# Patient Record
Sex: FEMALE | Race: WHITE | NOT HISPANIC OR LATINO | URBAN - METROPOLITAN AREA
[De-identification: names, ages, dates, MRNs, and addresses within clinical notes are randomized per-mention and may not be internally consistent; named-entity substitution may affect disease eponyms.]

---

## 2017-05-09 ENCOUNTER — EMERGENCY (EMERGENCY)
Facility: HOSPITAL | Age: 67
LOS: 0 days | Discharge: HOME | End: 2017-05-09
Attending: EMERGENCY MEDICINE | Admitting: EMERGENCY MEDICINE
Payer: COMMERCIAL

## 2017-05-09 VITALS
RESPIRATION RATE: 18 BRPM | DIASTOLIC BLOOD PRESSURE: 86 MMHG | SYSTOLIC BLOOD PRESSURE: 149 MMHG | OXYGEN SATURATION: 98 % | TEMPERATURE: 98 F | HEART RATE: 79 BPM

## 2017-05-09 VITALS — WEIGHT: 106.04 LBS | HEIGHT: 60 IN

## 2017-05-09 DIAGNOSIS — S52.502A UNSPECIFIED FRACTURE OF THE LOWER END OF LEFT RADIUS, INITIAL ENCOUNTER FOR CLOSED FRACTURE: ICD-10-CM

## 2017-05-09 DIAGNOSIS — Z98.890 OTHER SPECIFIED POSTPROCEDURAL STATES: Chronic | ICD-10-CM

## 2017-05-09 DIAGNOSIS — I10 ESSENTIAL (PRIMARY) HYPERTENSION: ICD-10-CM

## 2017-05-09 DIAGNOSIS — I34.0 NONRHEUMATIC MITRAL (VALVE) INSUFFICIENCY: ICD-10-CM
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
## 2017-05-09 PROCEDURE — 73090 X-RAY EXAM OF FOREARM: CPT | Mod: 26,LT

## 2017-05-09 PROCEDURE — 73110 X-RAY EXAM OF WRIST: CPT | Mod: 26,LT

## 2017-05-09 PROCEDURE — 99282 EMERGENCY DEPT VISIT SF MDM: CPT

## 2017-05-09 RX ORDER — MORPHINE SULFATE 50 MG/1
4 CAPSULE, EXTENDED RELEASE ORAL ONCE
Qty: 0 | Refills: 0 | Status: DISCONTINUED | OUTPATIENT
Start: 2017-05-09 | End: 2017-05-09

## 2017-05-09 RX ADMIN — MORPHINE SULFATE 4 MILLIGRAM(S): 50 CAPSULE, EXTENDED RELEASE ORAL at 18:19

## 2017-05-09 RX ADMIN — MORPHINE SULFATE 4 MILLIGRAM(S): 50 CAPSULE, EXTENDED RELEASE ORAL at 18:49

## 2017-05-09 NOTE — ED ADULT NURSE REASSESSMENT NOTE - NS ED NURSE REASSESS COMMENT FT1
Report taken at the change of shift. Pt awake alert and oriented x4 resting comfortably in bed with no acute distress noted. Agree with previous RN's assessment. c/o 8/10 left arm pain. VSS. Afebrile. Family at bedside. Awaiting for ortho. Will cont to monitor for safety and comfort.
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
## 2017-05-09 NOTE — ED STATDOCS - NS ED MD SCRIBE ATTENDING SCRIBE SECTIONS
DISPOSITION/HISTORY OF PRESENT ILLNESS/PHYSICAL EXAM/PAST MEDICAL/SURGICAL/SOCIAL HISTORY/RESULTS/REVIEW OF SYSTEMS/PROGRESS NOTE

## 2017-05-09 NOTE — ED STATDOCS - CARE PLAN
Principal Discharge DX:	Distal radius fracture  Instructions for follow-up, activity and diet:	1) Please take 400 mg of Ibuprofen (aka Motrin, Advil) and/or 500 mg Acetaminophen (aka Tylenol) every 6 hours, as needed, for mild-moderate pain.  Please do not take these medications if you have any history of bleeding disorders, kidney or liver disease.  2) Please take PERCOCET every 6 hours, as needed, for SEVERE pain. Please do not drive, make any important decisions or operate heavy machinery while on this medication.  This medication is very addicting, do not take unless absolutely necessary.  This medication also causes constipation.  3) Please follow-up with Dr. Mcrae as an outpatient.    4) If you have any worsening of symptoms please return to the ED immediately.  5) Please follow-up with Opthalmology as an outpatient.

## 2017-05-09 NOTE — ED STATDOCS - MUSCULOSKELETAL, MLM
DROM of left wrist secondary to the pain. TTP and edema left wrist. 2+ radial pulses. +DNVI. DROM of left wrist secondary to the pain. TTP and edema left wrist. 2+ radial pulses. +DNVI.  deformity left wrist.

## 2017-05-09 NOTE — ED STATDOCS - ATTENDING CONTRIBUTION TO CARE
I performed the initial face to face bedside interview with this patient regarding history of present illness, review of symptoms and past medical, social and family history.  I completed an independent physical examination.  I was the initial provider who evaluated this patient.  The history, review of symptoms and examination was documented by the resident in my presence and I attest to the accuracy of the documentation.  I have signed out the follow up of any pending tests (i.e. labs, radiological studies) to the resident.  I have discussed the patient’s plan of care and disposition with the resident.     I personally performed the services described in the documentation, reviewed the documentation recorded by the scribe in my presence and it accurately and completely records my words and action.

## 2017-05-09 NOTE — ED STATDOCS - PHYSICAL EXAMINATION
abrasion upper lip, inner aspect  no nasal septal hematoma, no loose teeth, no tongue biting abrasion upper lip, inner aspect  no nasal septal hematoma, no loose teeth, no tongue biting  visual fields grossly intact. states that she has some blurry vision in the right eye abrasion upper lip, inner aspect  no nasal septal hematoma, no loose teeth, no tongue biting  visual fields grossly intact. states that she has some blurry vision in the right eye    Pt with left wrist pain, swelling.  2+ radial pulse intact.  Decreased ROM 2/2 pain.  Sensory exam intact L hand/arm.  Yosi Peck DO.

## 2017-05-09 NOTE — ED STATDOCS - PSH
H/O cataract extraction, right    History of   x 4  History of trabeculectomy  right  History of vitrectomy  Right eye  Hx of hysterectomy    S/P left knee arthroscopy

## 2017-05-09 NOTE — ED STATDOCS - PLAN OF CARE
1) Please take 400 mg of Ibuprofen (aka Motrin, Advil) and/or 500 mg Acetaminophen (aka Tylenol) every 6 hours, as needed, for mild-moderate pain.  Please do not take these medications if you have any history of bleeding disorders, kidney or liver disease.  2) Please take PERCOCET every 6 hours, as needed, for SEVERE pain. Please do not drive, make any important decisions or operate heavy machinery while on this medication.  This medication is very addicting, do not take unless absolutely necessary.  This medication also causes constipation.  3) Please follow-up with Dr. Mcrae as an outpatient.    4) If you have any worsening of symptoms please return to the ED immediately.  5) Please follow-up with Opthalmology as an outpatient.

## 2017-05-09 NOTE — ED STATDOCS - PMH
Arthritis    Glaucoma    H/O irritable bowel syndrome    H/O: hypertension    MR (mitral regurgitation)    OP (osteoporosis)    Psoriasis    Tricuspid regurgitation
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
## 2020-10-16 ENCOUNTER — APPOINTMENT (OUTPATIENT)
Dept: RHEUMATOLOGY | Facility: CLINIC | Age: 70
End: 2020-10-16
Payer: COMMERCIAL

## 2020-10-16 VITALS
BODY MASS INDEX: 21.6 KG/M2 | SYSTOLIC BLOOD PRESSURE: 110 MMHG | DIASTOLIC BLOOD PRESSURE: 70 MMHG | HEIGHT: 60 IN | HEART RATE: 67 BPM | OXYGEN SATURATION: 97 % | WEIGHT: 110 LBS | TEMPERATURE: 97.5 F

## 2020-10-16 DIAGNOSIS — H40.9 UNSPECIFIED GLAUCOMA: ICD-10-CM

## 2020-10-16 DIAGNOSIS — Z84.0 FAMILY HISTORY OF DISEASES OF THE SKIN AND SUBCUTANEOUS TISSUE: ICD-10-CM

## 2020-10-16 DIAGNOSIS — Z78.9 OTHER SPECIFIED HEALTH STATUS: ICD-10-CM

## 2020-10-16 DIAGNOSIS — Z82.69 FAMILY HISTORY OF OTHER DISEASES OF THE MUSCULOSKELETAL SYSTEM AND CONNECTIVE TISSUE: ICD-10-CM

## 2020-10-16 DIAGNOSIS — I10 ESSENTIAL (PRIMARY) HYPERTENSION: ICD-10-CM

## 2020-10-16 DIAGNOSIS — Z82.61 FAMILY HISTORY OF ARTHRITIS: ICD-10-CM

## 2020-10-16 DIAGNOSIS — D75.1 SECONDARY POLYCYTHEMIA: ICD-10-CM

## 2020-10-16 PROCEDURE — 99243 OFF/OP CNSLTJ NEW/EST LOW 30: CPT

## 2020-10-16 RX ORDER — CLOBETASOL PROPIONATE 0.5 MG/G
0.05 AEROSOL, FOAM TOPICAL TWICE DAILY
Qty: 1 | Refills: 3 | Status: ACTIVE | COMMUNITY
Start: 2020-10-16 | End: 1900-01-01

## 2020-10-16 RX ORDER — DICLOFENAC SODIUM 10 MG/G
1 GEL TOPICAL
Qty: 1 | Refills: 3 | Status: ACTIVE | COMMUNITY
Start: 2020-10-16 | End: 1900-01-01

## 2020-10-16 RX ORDER — HYDROCHLOROTHIAZIDE 12.5 MG/1
12.5 TABLET ORAL
Qty: 90 | Refills: 0 | Status: ACTIVE | COMMUNITY

## 2020-10-16 RX ORDER — LOTEPREDNOL ETABONATE 5 MG/ML
0.5 SUSPENSION/ DROPS OPHTHALMIC
Refills: 0 | Status: ACTIVE | COMMUNITY

## 2020-10-16 NOTE — REVIEW OF SYSTEMS
[Arthralgias] : arthralgias [Joint Pain] : joint pain [As Noted in HPI] : as noted in HPI [Skin Lesions] : skin lesion [Negative] : Heme/Lymph [FreeTextEntry9] : no synovitis, fROM  [de-identified] : scalp psoriasis  [FreeTextEntry3] : visual loss R eye long standing and denies inflammatory eye dz

## 2020-10-16 NOTE — DATA REVIEWED
[FreeTextEntry1] : \par Other Diagnostics: \par MRI R shoulder 8/20 w/ tendinosis and RTC pathology, AC arthritis and small GH effusion, biceps tendinosis

## 2020-10-16 NOTE — PHYSICAL EXAM
[General Appearance - Alert] : alert [General Appearance - Well Nourished] : well nourished [General Appearance - In No Acute Distress] : in no acute distress [General Appearance - Well Developed] : well developed [General Appearance - Well-Appearing] : healthy appearing [Sclera] : the sclera and conjunctiva were normal [Outer Ear] : the ears and nose were normal in appearance [Nasal Cavity] : the nasal mucosa and septum were normal [Neck Appearance] : the appearance of the neck was normal [Neck Cervical Mass (___cm)] : no neck mass was observed [Jugular Venous Distention Increased] : there was no jugular-venous distention [Thyroid Diffuse Enlargement] : the thyroid was not enlarged [Auscultation Breath Sounds / Voice Sounds] : lungs were clear to auscultation bilaterally [Thyroid Nodule] : there were no palpable thyroid nodules [Heart Sounds Gallop] : no gallops [Heart Rate And Rhythm] : heart rate was normal and rhythm regular [Heart Sounds] : normal S1 and S2 [Heart Sounds Pericardial Friction Rub] : no pericardial rub [Murmurs] : no murmurs [Full Pulse] : the pedal pulses are present [Edema] : there was no peripheral edema [Abdomen Soft] : soft [Bowel Sounds] : normal bowel sounds [Abdomen Tenderness] : non-tender [] : no hepato-splenomegaly [Cervical Lymph Nodes Enlarged Posterior Bilaterally] : posterior cervical [Abdomen Mass (___ Cm)] : no abdominal mass palpated [Cervical Lymph Nodes Enlarged Anterior Bilaterally] : anterior cervical [Supraclavicular Lymph Nodes Enlarged Bilaterally] : supraclavicular [No CVA Tenderness] : no ~M costovertebral angle tenderness [No Spinal Tenderness] : no spinal tenderness [Abnormal Walk] : normal gait [Musculoskeletal - Swelling] : no joint swelling seen [Nail Clubbing] : no clubbing  or cyanosis of the fingernails [Motor Tone] : muscle strength and tone were normal [FreeTextEntry1] : Scalp psoriasis patch over L side occipital region; no other lesions seen; nails painted [Deep Tendon Reflexes (DTR)] : deep tendon reflexes were 2+ and symmetric [Sensation] : the sensory exam was normal to light touch and pinprick [No Focal Deficits] : no focal deficits [Oriented To Time, Place, And Person] : oriented to person, place, and time [Impaired Insight] : insight and judgment were intact [Affect] : the affect was normal

## 2020-10-16 NOTE — CONSULT LETTER
[Dear  ___] : Dear  [unfilled], [Consult Closing:] : Thank you very much for allowing me to participate in the care of this patient.  If you have any questions, please do not hesitate to contact me. [Consult Letter:] : I had the pleasure of evaluating your patient, [unfilled]. [FreeTextEntry1] : Please see below for summary of  recent rheumatology evaluation and recommendations.\par \par 70 yo wf  healthy, mild HTN well controlled and small patch psoriasis - scalp with c/o joint pain.  \par \par 1) Psoriasis:  only one patch with joint pain but no over synovitis.  R shoulder RTC pathology and tendinosis and recent rupture biceps tendon.. but exercises/ heavy lifting and thought to be r/t... no other spontaneous sx.  Occas sx of plantar fascitis, not active now.. again exercises routinely.. \par Little on exam today to sugget PsA.. literature provided and advised to return immediately if present.  \par No hx inflammatory back sx and no SI tenderness, will not do imaging at this point, if sx change would then consider, denies inflammatory eye dz and no  triggering or  locking \par NO inflammatory back pain\par Recent comprehensive labs for elevated Hb.. w/u neg but will review to evaluate for evidence of systemic inflammatory condition.. \par + FH mother also w/ Psoriasis  \par \par 2) OA:  diffusely, mild with no deformities and fROM throughout\par \par 3) IBS-C \par + IBS C daily probiotic + response..and Ibguard..\par \par 4) Raynauds phenomenon:  mild, for many ys w/ little in H & P to suggest CTD.  Denies mucositis, serositis (no cardiopulmonary, HSM), no renal dysfunction, rash, alopecia, cytopenias, bleeding or clotting dyscrasias\par \par 5) Osteoporosis:  on Actonel monthly but admits to poor compliance though well tolerated. Updated study 11/20 scheduled.. Dr. Jose \par Taking Vit D and routine wt bearing activity\par Stable wt x many ys w/ BMI 21\par Minimal other risk factors \par L wrist fracture - traumatically induced post menopausal/ R ankle fracture- trauma premenopausal \par \par Age appropriate malignancy screening:  \par colonoscopy/ endoscopy, mammogram 2020 neg, pap 2020, no recent  CXR\par Osteoporosis 2019 updated.. severe \par \par Plan: \par - Voltaren gel for joint pain and continue routine exercise, total conditioning for joint protection\par - literature provided PsA, reassurance that little to suggest at this time, call immediately if anything changes \par - clobetasol foam for scalp lesions.. apply twice daily as needed\par - f/u Dr. Jose for OP updated eval/ treatment.  Discussed Relast if compliance continues to be an issue \par - need labs from Dr Andres' w/u - hem/ onc\par  [FreeTextEntry2] : Rafa Judge MD\par [phone] [Sincerely,] : Sincerely, [FreeTextEntry3] : Cayla Yates DNP, ANP-C\par Division or Rheumatology\par [facility]\par \par

## 2020-10-16 NOTE — HISTORY OF PRESENT ILLNESS
[FreeTextEntry1] : 68 yo wf  w/ hx of psoriasis with recent increase in joint pain involving  knees, hands, R ankle (old fracture, intermittent swelling),  and most significantly R shoulder mild intermittent ... \par Stiffness in am less than 30 mins \par Hx of plantar fascitis, occas shoulder bursitis.. with RTC partial tear (confirmed MRI w/ diffuse inflammatory changes)  and bicep tear R arm.. 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 seen by Dr. Andres no treatment or additional studies beyond labs - BM not indicated \par + IBS C daily probiotic + response..and Ibguard..\par  \par Occas numbness w/ raynauds changes for many no digital ulcerations.  \par \par Mild HTN \par Age appropriate malignancy screening:  \par colonoscopy/ endoscopy, mammogram, pap 2020  smear, CXR\par Osteoporosis 2019 updated.. severe \par \par +FH mother psoriasis.. .

## 2020-10-19 ENCOUNTER — APPOINTMENT (OUTPATIENT)
Dept: ORTHOPEDIC SURGERY | Facility: CLINIC | Age: 70
End: 2020-10-19
Payer: COMMERCIAL

## 2020-10-19 VITALS
WEIGHT: 110 LBS | BODY MASS INDEX: 21.6 KG/M2 | HEIGHT: 60 IN | HEART RATE: 69 BPM | TEMPERATURE: 98.7 F | SYSTOLIC BLOOD PRESSURE: 151 MMHG | DIASTOLIC BLOOD PRESSURE: 75 MMHG

## 2020-10-19 DIAGNOSIS — Z87.39 PERSONAL HISTORY OF OTHER DISEASES OF THE MUSCULOSKELETAL SYSTEM AND CONNECTIVE TISSUE: ICD-10-CM

## 2020-10-19 DIAGNOSIS — Z72.89 OTHER PROBLEMS RELATED TO LIFESTYLE: ICD-10-CM

## 2020-10-19 DIAGNOSIS — M25.511 PAIN IN RIGHT SHOULDER: ICD-10-CM

## 2020-10-19 DIAGNOSIS — Z86.79 PERSONAL HISTORY OF OTHER DISEASES OF THE CIRCULATORY SYSTEM: ICD-10-CM

## 2020-10-19 DIAGNOSIS — G89.29 PAIN IN RIGHT SHOULDER: ICD-10-CM

## 2020-10-19 PROCEDURE — 99072 ADDL SUPL MATRL&STAF TM PHE: CPT

## 2020-10-19 PROCEDURE — 99203 OFFICE O/P NEW LOW 30 MIN: CPT

## 2020-10-21 NOTE — HISTORY OF PRESENT ILLNESS
[5] : a current pain level of 5/10 [6] : the ailment interference is 6/10 [8] : the ailment interference is 8/10 [7] : the ailment interference is 7/10 [(Does not interfere) 0] : the ailment interference is 0/10 (does not interfere) [9] : the ailment interference is 9/10 [2] : the ailment interference is 2/10 [de-identified] : Nicky comes in today with complaints to her right shoulder.  She had an MRI over eight to nine months ago.  Subsequently, she tore her proximal biceps.  She is having persistent complaints of pain.  She states she thinks she had two, maybe three, injections last year.  The patient states the pain is constant.  The patient describes the pain as throbbing.  The patient states ice and moist heat make the symptoms better while lifting and lying down makes the symptoms worse.  [] : No [de-identified] : Advil [de-identified] : Extra Strength Tylenol

## 2020-10-21 NOTE — PHYSICAL EXAM
[de-identified] : Left Shoulder:\par Shoulder: Range of Motion in Degrees:   	\par    	                        Claimant:  	Normal:  	\par Abduction (Active)  	180  	180 degrees  	\par Abduction (Passive)  	180  	180 degrees  	\par Forward elevation (Active):  	180  	180 degrees  	\par Forward elevation (Passive):  180  	180 degrees  	\par External rotation (Active):  	45  	45 degrees  	\par External rotation (Passive):  	45  	45 degrees  	\par Internal rotation (Active):  	L-1  	L-1  	\par Internal rotation (Passive):  	L-1  	L-1  \par 	\par No motor weakness to internal rotation, external rotation or abduction in the scapular plane.  Negative crank test.  Negative O’Gustavo’s test.  Negative Speed’s test. Negative Yergason’s test.  Negative cross arm test.  No tenderness to palpation at the AC joint. Negative Hawkin’s sign.  Negative Neer’s sign.  Negative apprehension. Negative sulcus sign.  No gross neurological or vascular deficits distally.  Skin is intact.  No rashes, scars or lesions. 2+ radial and ulnar pulses. No extra-articular swelling or tenderness.\par  \par Right Shoulder:\par Shoulder: Range of Motion in Degrees:	\par 	                                  Claimant:	Normal:	\par Abduction (Active)	                  180	               180 degrees	\par Abduction (Passive)	  180	               180 degrees	\par Forward elevation (Active):	  180	               180 degrees	\par Forward elevation (Passive):	  180	               180 degrees	\par External rotation (Active):	   45	               45 degrees	\par External rotation (Passive):	   45	               45 degrees	\par Internal rotation (Active):	   L-1	               L-1	\par Internal rotation (Passive):	   L-1	               L-1	\par  \par No motor weakness to internal rotation, external rotation or abduction in the scapular plane.  Negative crank test.  Negative O’Gustavo’s test.  Negative Speed’s test. Negative Yergason’s test.  Positive cross arm test.  Positive tenderness to palpation over the AC joint.  Positive Hawkin’s sign.  Positive Neer’s sign. Negative apprehension. Negative sulcus sign.  No gross neurological or vascular deficits distally.  Positive proximal biceps rupture.  Positive Calvin sign.  Positive ecchymosis.  2+ radial and ulnar pulses. No extra-articular swelling or tenderness. \par \par 	\par  [de-identified] : Gait: normal    Station: normal  [de-identified] : Appearance: Well-developed, well-nourished female  in no acute distress.

## 2020-10-21 NOTE — DISCUSSION/SUMMARY
[de-identified] : The patient presents with impingement, AC arthritis and proximal biceps rupture, right shoulder.  At this time I recommend an updated MRI before consideration for a repeat injection.  We discussed the potential for further therapy and she will be reassessed in two or three weeks.

## 2020-10-21 NOTE — ADDENDUM
[FreeTextEntry1] : This note was written by Jennifer Menchaca on 10/20/2020 acting as scribe for Kali Smith III, MD

## 2020-10-26 ENCOUNTER — RESULT REVIEW (OUTPATIENT)
Age: 70
End: 2020-10-26

## 2020-10-26 ENCOUNTER — TRANSCRIPTION ENCOUNTER (OUTPATIENT)
Age: 70
End: 2020-10-26

## 2020-10-26 ENCOUNTER — OUTPATIENT (OUTPATIENT)
Dept: OUTPATIENT SERVICES | Facility: HOSPITAL | Age: 70
LOS: 1 days | End: 2020-10-26
Payer: COMMERCIAL

## 2020-10-26 ENCOUNTER — APPOINTMENT (OUTPATIENT)
Dept: MRI IMAGING | Facility: CLINIC | Age: 70
End: 2020-10-26
Payer: COMMERCIAL

## 2020-10-26 DIAGNOSIS — Z98.890 OTHER SPECIFIED POSTPROCEDURAL STATES: Chronic | ICD-10-CM

## 2020-10-26 DIAGNOSIS — M25.511 PAIN IN RIGHT SHOULDER: ICD-10-CM

## 2020-10-26 PROCEDURE — 73221 MRI JOINT UPR EXTREM W/O DYE: CPT

## 2020-10-26 PROCEDURE — 73221 MRI JOINT UPR EXTREM W/O DYE: CPT | Mod: 26,RT

## 2020-10-27 ENCOUNTER — APPOINTMENT (OUTPATIENT)
Dept: ORTHOPEDIC SURGERY | Facility: CLINIC | Age: 70
End: 2020-10-27

## 2020-11-02 ENCOUNTER — APPOINTMENT (OUTPATIENT)
Dept: ORTHOPEDIC SURGERY | Facility: CLINIC | Age: 70
End: 2020-11-02
Payer: COMMERCIAL

## 2020-11-02 DIAGNOSIS — M19.011 PRIMARY OSTEOARTHRITIS, RIGHT SHOULDER: ICD-10-CM

## 2020-11-02 DIAGNOSIS — M75.121 COMPLETE ROTATOR CUFF TEAR OR RUPTURE OF RIGHT SHOULDER, NOT SPECIFIED AS TRAUMATIC: ICD-10-CM

## 2020-11-02 DIAGNOSIS — S46.219A STRAIN OF MUSCLE, FASCIA AND TENDON OF OTHER PARTS OF BICEPS, UNSPECIFIED ARM, INITIAL ENCOUNTER: ICD-10-CM

## 2020-11-02 PROCEDURE — 99441: CPT

## 2020-11-12 PROBLEM — S46.219A BICEPS TENDON TEAR: Status: ACTIVE | Noted: 2020-10-16

## 2021-01-13 ENCOUNTER — APPOINTMENT (OUTPATIENT)
Dept: ORTHOPEDIC SURGERY | Facility: CLINIC | Age: 71
End: 2021-01-13
Payer: COMMERCIAL

## 2021-01-13 VITALS
DIASTOLIC BLOOD PRESSURE: 79 MMHG | TEMPERATURE: 95.3 F | HEIGHT: 60 IN | HEART RATE: 68 BPM | SYSTOLIC BLOOD PRESSURE: 162 MMHG | BODY MASS INDEX: 21.2 KG/M2 | WEIGHT: 108 LBS

## 2021-01-13 PROCEDURE — 99072 ADDL SUPL MATRL&STAF TM PHE: CPT

## 2021-01-13 PROCEDURE — 20610 DRAIN/INJ JOINT/BURSA W/O US: CPT | Mod: RT

## 2021-01-14 NOTE — PHYSICAL EXAM
[de-identified] : Right Shoulder:  \par Range of Motion in Degrees:	\par 	                                  Claimant:	Normal:	\par Abduction (Active)	                  180	               180 degrees	\par Abduction (Passive)	  180	               180 degrees	\par Forward elevation (Active):	  180	               180 degrees	\par Forward elevation (Passive):	  180	               180 degrees	\par External rotation (Active):	   45	               45 degrees	\par External rotation (Passive):	   45	               45 degrees	\par Internal rotation (Active):	   L-1	               L-1	\par Internal rotation (Passive):	   L-1	               L-1	\par  \par No motor weakness to internal rotation, external rotation or abduction in the scapular plane.  Negative crank test.  Negative O’Gustavo’s test.  Negative Speed’s test. Negative Yergason’s test.  Negative cross arm test.  No tenderness to palpation at the AC joint. Positive Hawkin’s sign.  Positive Neer’s sign. Negative apprehension. Negative sulcus sign.  No gross neurological or vascular deficits distally.  Skin is intact.  No rashes, scars or lesions. 2+ radial and ulnar pulses. No extra-articular swelling or tenderness.\par   [de-identified] : Appearance:  Well-developed, well-nourished female in no acute distress.\par   [de-identified] : Ambulating with a normal gait.  Station:  Normal.

## 2021-01-14 NOTE — PROCEDURE
[de-identified] : Consent: \par At this time, I have recommended an injection to the right shoulder.  The risks and benefits of the procedure were discussed with the patient in detail.  Upon verbal consent of the patient, we proceeded with the injection as noted below.  \par \par Procedure:  \par After a sterile prep, the patient underwent an injection of 9 cc of 1% Lidocaine without epinephrine and 1 cc of Kenalog into the right shoulder.  The patient tolerated the procedure well.  There were no complications.  \par

## 2021-01-14 NOTE — DISCUSSION/SUMMARY
[de-identified] : At this time, due to impingement syndrome of the right shoulder, I recommended ice and elevation.  She will be reassessed in three to four weeks.

## 2021-01-14 NOTE — ADDENDUM
[FreeTextEntry1] : This note was written by Sanjuanita Arias on 01/14/2021 acting as a scribe for JUDY FARR III, MD

## 2021-01-28 ENCOUNTER — APPOINTMENT (OUTPATIENT)
Dept: ORTHOPEDIC SURGERY | Facility: CLINIC | Age: 71
End: 2021-01-28
Payer: COMMERCIAL

## 2021-01-28 DIAGNOSIS — M75.41 IMPINGEMENT SYNDROME OF RIGHT SHOULDER: ICD-10-CM

## 2021-01-28 PROCEDURE — 99441: CPT

## 2021-02-03 PROBLEM — M75.41 IMPINGEMENT SYNDROME OF RIGHT SHOULDER: Status: ACTIVE | Noted: 2020-10-19

## 2021-08-26 ENCOUNTER — NON-APPOINTMENT (OUTPATIENT)
Age: 71
End: 2021-08-26

## 2021-08-26 ENCOUNTER — LABORATORY RESULT (OUTPATIENT)
Age: 71
End: 2021-08-26

## 2021-08-26 ENCOUNTER — OUTPATIENT (OUTPATIENT)
Dept: OUTPATIENT SERVICES | Facility: HOSPITAL | Age: 71
LOS: 1 days | End: 2021-08-26
Payer: COMMERCIAL

## 2021-08-26 ENCOUNTER — APPOINTMENT (OUTPATIENT)
Dept: RHEUMATOLOGY | Facility: CLINIC | Age: 71
End: 2021-08-26
Payer: COMMERCIAL

## 2021-08-26 ENCOUNTER — APPOINTMENT (OUTPATIENT)
Dept: RADIOLOGY | Facility: CLINIC | Age: 71
End: 2021-08-26
Payer: COMMERCIAL

## 2021-08-26 VITALS
HEART RATE: 68 BPM | OXYGEN SATURATION: 98 % | WEIGHT: 108 LBS | SYSTOLIC BLOOD PRESSURE: 138 MMHG | TEMPERATURE: 97.6 F | BODY MASS INDEX: 21.09 KG/M2 | DIASTOLIC BLOOD PRESSURE: 72 MMHG

## 2021-08-26 DIAGNOSIS — Z98.890 OTHER SPECIFIED POSTPROCEDURAL STATES: Chronic | ICD-10-CM

## 2021-08-26 DIAGNOSIS — D75.89 OTHER SPECIFIED DISEASES OF BLOOD AND BLOOD-FORMING ORGANS: ICD-10-CM

## 2021-08-26 DIAGNOSIS — L40.9 PSORIASIS, UNSPECIFIED: ICD-10-CM

## 2021-08-26 DIAGNOSIS — M46.1 SACROILIITIS, NOT ELSEWHERE CLASSIFIED: ICD-10-CM

## 2021-08-26 DIAGNOSIS — D78.89 OTHER POSTPROCEDURAL COMPLICATIONS OF THE SPLEEN: ICD-10-CM

## 2021-08-26 DIAGNOSIS — L40.50 ARTHROPATHIC PSORIASIS, UNSPECIFIED: ICD-10-CM

## 2021-08-26 PROCEDURE — 73522 X-RAY EXAM HIPS BI 3-4 VIEWS: CPT | Mod: 26

## 2021-08-26 PROCEDURE — 73522 X-RAY EXAM HIPS BI 3-4 VIEWS: CPT

## 2021-08-26 PROCEDURE — 36415 COLL VENOUS BLD VENIPUNCTURE: CPT

## 2021-08-26 PROCEDURE — 73120 X-RAY EXAM OF HAND: CPT | Mod: 26,50

## 2021-08-26 PROCEDURE — 99214 OFFICE O/P EST MOD 30 MIN: CPT | Mod: 25

## 2021-08-26 PROCEDURE — 73120 X-RAY EXAM OF HAND: CPT

## 2021-08-26 NOTE — REVIEW OF SYSTEMS
[Arthralgias] : arthralgias [Joint Pain] : joint pain [As Noted in HPI] : as noted in HPI [Skin Lesions] : skin lesion [Negative] : Heme/Lymph [FreeTextEntry3] : visual loss R eye long standing and denies inflammatory eye dz [de-identified] : scalp psoriasis  [FreeTextEntry9] : no synovitis, fROM

## 2021-08-26 NOTE — ASSESSMENT
[FreeTextEntry1] : 71 yo wf  healthy, mild HTN, polycythemia w/ macrocytosis, osteoporosis.. well controlled and small patch psoriasis - scalp with c/o joint pain.  \par \par 1) Psoriasis possible PsA: presents with increase c/o stifness and several PIP joints today full/ boggy and ttt w/ stiffness in knees and R ankle.  \par Psoriasis well controlled.. \par Will try 2 wk 10 mg pred - increase to 20 mg if necessary.. if dramatic improvement would consider early tx w/ SSA/ or Otezla.. \par If not will continue to tx regional pain PRN.. \par Continues w/ topical NSAIds/ CBD with some improvement \par No personal  AS, inflammatory back, bowel or eye dz- but will get SI films / [id number] now\par NOTE: \par Hx significant R shoulder RTC pathology and tendinosis and recent rupture biceps tendon.. but exercises/ heavy lifting and thought to be r/t... no other spontaneous sx.  Occas sx of plantar fascitis\par + FH mother also w/ Psoriasis  \par \par 2) OA:  diffusely, mild with no deformities and fROM throughout\par \par 3) IBS-C \par + IBS C daily probiotic + response..and Ibguard..\par \par 4) Raynauds phenomenon:  mild, for many ys w/ little in H & P to suggest CTD.  Denies mucositis, serositis (no cardiopulmonary, HSM), no renal dysfunction, rash, alopecia, cytopenias, bleeding or clotting dyscrasias\par \par 5) Osteoporosis:  on Actonel monthly but admits to poor compliance though well tolerated. Updated study 11/20 scheduled.. Dr. Jose \par Taking Vit D and routine wt bearing activity\par Stable wt x many ys w/ BMI 21\par Minimal other risk factors \par L wrist fracture - traumatically induced post menopausal/ R ankle fracture- trauma premenopausal \par \par Age appropriate malignancy screening:  \par colonoscopy/ endoscopy, mammogram 2020 neg, pap 2020, no recent  CXR\par Osteoporosis 2019 updated.. severe \par \par Plan: \par - start prednisone 10 mg daily x 2 wks if dramatic improvement overall we need to think about treatment for mild Psoriatic arthrititis.. I would probably consider sulfasalazine or Otezla \par \par - if no better after 1 wk increase to 20 mg ..\par \par - labs now \par \par - xrays before our call . \par \par - call office in 3 with update (schedule a TELEPHONE visit) \par \par - OK to continue  Voltaren gel / and or CBD \par \par - office visit in 4 m

## 2021-08-26 NOTE — PHYSICAL EXAM
[General Appearance - Alert] : alert [General Appearance - In No Acute Distress] : in no acute distress [General Appearance - Well Nourished] : well nourished [General Appearance - Well Developed] : well developed [General Appearance - Well-Appearing] : healthy appearing [Sclera] : the sclera and conjunctiva were normal [Outer Ear] : the ears and nose were normal in appearance [Nasal Cavity] : the nasal mucosa and septum were normal [Neck Appearance] : the appearance of the neck was normal [Neck Cervical Mass (___cm)] : no neck mass was observed [Jugular Venous Distention Increased] : there was no jugular-venous distention [Thyroid Diffuse Enlargement] : the thyroid was not enlarged [Thyroid Nodule] : there were no palpable thyroid nodules [Auscultation Breath Sounds / Voice Sounds] : lungs were clear to auscultation bilaterally [Heart Rate And Rhythm] : heart rate was normal and rhythm regular [Heart Sounds] : normal S1 and S2 [Heart Sounds Gallop] : no gallops [Murmurs] : no murmurs [Heart Sounds Pericardial Friction Rub] : no pericardial rub [Full Pulse] : the pedal pulses are present [Edema] : there was no peripheral edema [Bowel Sounds] : normal bowel sounds [Abdomen Soft] : soft [] : no hepato-splenomegaly [Abdomen Tenderness] : non-tender [Abdomen Mass (___ Cm)] : no abdominal mass palpated [Cervical Lymph Nodes Enlarged Posterior Bilaterally] : posterior cervical [Cervical Lymph Nodes Enlarged Anterior Bilaterally] : anterior cervical [Supraclavicular Lymph Nodes Enlarged Bilaterally] : supraclavicular [No CVA Tenderness] : no ~M costovertebral angle tenderness [No Spinal Tenderness] : no spinal tenderness [Abnormal Walk] : normal gait [Nail Clubbing] : no clubbing  or cyanosis of the fingernails [Musculoskeletal - Swelling] : no joint swelling seen [Motor Tone] : muscle strength and tone were normal [Deep Tendon Reflexes (DTR)] : deep tendon reflexes were 2+ and symmetric [Sensation] : the sensory exam was normal to light touch and pinprick [No Focal Deficits] : no focal deficits [Oriented To Time, Place, And Person] : oriented to person, place, and time [Impaired Insight] : insight and judgment were intact [Affect] : the affect was normal [FreeTextEntry1] : Scalp psoriasis patch over L side occipital region- resolved; no other lesions seen; nails painted

## 2021-09-01 LAB
ALBUMIN SERPL ELPH-MCNC: 4.6 G/DL
ALP BLD-CCNC: 82 U/L
ALT SERPL-CCNC: 18 U/L
ANION GAP SERPL CALC-SCNC: 11 MMOL/L
AST SERPL-CCNC: 28 U/L
BASOPHILS # BLD AUTO: 0.07 K/UL
BASOPHILS NFR BLD AUTO: 1.1 %
BILIRUB SERPL-MCNC: 0.9 MG/DL
BUN SERPL-MCNC: 25 MG/DL
CALCIUM SERPL-MCNC: 9.9 MG/DL
CHLORIDE SERPL-SCNC: 99 MMOL/L
CO2 SERPL-SCNC: 31 MMOL/L
CREAT SERPL-MCNC: 0.79 MG/DL
CRP SERPL-MCNC: <3 MG/L
EOSINOPHIL # BLD AUTO: 0.13 K/UL
EOSINOPHIL NFR BLD AUTO: 2.1 %
ERYTHROCYTE [SEDIMENTATION RATE] IN BLOOD BY WESTERGREN METHOD: 12 MM/HR
FOLATE SERPL-MCNC: >20 NG/ML
GLUCOSE SERPL-MCNC: 89 MG/DL
HCT VFR BLD CALC: 46.5 %
HGB BLD-MCNC: 14.7 G/DL
IMM GRANULOCYTES NFR BLD AUTO: 0.2 %
LYMPHOCYTES # BLD AUTO: 2.15 K/UL
LYMPHOCYTES NFR BLD AUTO: 34.7 %
MAN DIFF?: NORMAL
MCHC RBC-ENTMCNC: 31.6 GM/DL
MCHC RBC-ENTMCNC: 33.3 PG
MCV RBC AUTO: 105.4 FL
MONOCYTES # BLD AUTO: 0.44 K/UL
MONOCYTES NFR BLD AUTO: 7.1 %
NEUTROPHILS # BLD AUTO: 3.39 K/UL
NEUTROPHILS NFR BLD AUTO: 54.8 %
PLATELET # BLD AUTO: 219 K/UL
POTASSIUM SERPL-SCNC: 3.8 MMOL/L
PROT SERPL-MCNC: 6.8 G/DL
RBC # BLD: 4.41 M/UL
RBC # FLD: 12.6 %
SODIUM SERPL-SCNC: 141 MMOL/L
VIT B12 SERPL-MCNC: 953 PG/ML
WBC # FLD AUTO: 6.19 K/UL

## 2021-09-14 LAB — HLA-B27 RELATED AG QL: POSITIVE

## 2021-09-22 RX ORDER — PREDNISONE 5 MG/1
5 TABLET ORAL
Qty: 60 | Refills: 0 | Status: ACTIVE | COMMUNITY
Start: 2021-08-26 | End: 1900-01-01

## 2021-09-29 ENCOUNTER — APPOINTMENT (OUTPATIENT)
Dept: RHEUMATOLOGY | Facility: CLINIC | Age: 71
End: 2021-09-29
Payer: COMMERCIAL

## 2021-09-29 PROCEDURE — 99442: CPT

## 2021-10-06 PROBLEM — I10 ESSENTIAL HYPERTENSION: Status: ACTIVE | Noted: 2020-10-16

## 2021-10-19 ENCOUNTER — APPOINTMENT (OUTPATIENT)
Dept: RHEUMATOLOGY | Facility: CLINIC | Age: 71
End: 2021-10-19
Payer: COMMERCIAL

## 2021-10-19 PROCEDURE — 99442: CPT

## 2021-10-22 ENCOUNTER — NON-APPOINTMENT (OUTPATIENT)
Age: 71
End: 2021-10-22

## 2021-11-20 ENCOUNTER — TRANSCRIPTION ENCOUNTER (OUTPATIENT)
Age: 71
End: 2021-11-20

## 2021-11-26 ENCOUNTER — NON-APPOINTMENT (OUTPATIENT)
Age: 71
End: 2021-11-26

## 2021-11-29 ENCOUNTER — TRANSCRIPTION ENCOUNTER (OUTPATIENT)
Age: 71
End: 2021-11-29

## 2022-01-27 ENCOUNTER — RX RENEWAL (OUTPATIENT)
Age: 72
End: 2022-01-27

## 2022-01-28 ENCOUNTER — APPOINTMENT (OUTPATIENT)
Dept: RHEUMATOLOGY | Facility: CLINIC | Age: 72
End: 2022-01-28
Payer: COMMERCIAL

## 2022-01-28 VITALS
HEART RATE: 66 BPM | BODY MASS INDEX: 22.19 KG/M2 | WEIGHT: 113 LBS | HEIGHT: 60 IN | DIASTOLIC BLOOD PRESSURE: 90 MMHG | OXYGEN SATURATION: 87 % | TEMPERATURE: 95.1 F | SYSTOLIC BLOOD PRESSURE: 100 MMHG

## 2022-01-28 DIAGNOSIS — K58.1 IRRITABLE BOWEL SYNDROME WITH CONSTIPATION: ICD-10-CM

## 2022-01-28 DIAGNOSIS — M25.50 PAIN IN UNSPECIFIED JOINT: ICD-10-CM

## 2022-01-28 DIAGNOSIS — M81.0 AGE-RELATED OSTEOPOROSIS W/OUT CURRENT PATHOLOGICAL FRACTURE: ICD-10-CM

## 2022-01-28 PROCEDURE — 36415 COLL VENOUS BLD VENIPUNCTURE: CPT

## 2022-01-28 PROCEDURE — 99214 OFFICE O/P EST MOD 30 MIN: CPT | Mod: 25

## 2022-01-28 RX ORDER — METHOTREXATE 2.5 MG/1
2.5 TABLET ORAL
Qty: 24 | Refills: 2 | Status: DISCONTINUED | COMMUNITY
Start: 2021-10-22 | End: 2022-01-28

## 2022-01-28 NOTE — HISTORY OF PRESENT ILLNESS
[FreeTextEntry1] : 20\par Continues on 10 mg MTX started 10/21- x 4 wks then held for 4-6 wks for acute infection, restarted 4 wks ago.. very bothered by medication and not convinced it's doing anything.  Patch of psoriasis has not returned, and diffuse migratory asymmetrical joint pain persists.. with minimal overt swelling or limited ROM lately.. \par - frustrated w/ hair loss.. and notes recurrent nasal congestion, at times GI distress bloating which made IBS worse.. and feels skin color.  \par - acute severe bronchitis repeatedly tested and neg for COVID \par - baseline studies  neg HLAB27, and SI joints, hands neg for erosions..   \par \par 21 \par - R 4 PIP recurrent swelling with increased pain/ stiffness; Knees stiff, uncomfortable and stiff and painful.. R ankle pain (not new - site of injury) and intermittently R shoulder w/ hx of partial tear of RTC and bicep tear... (again heavy lifting)...\par - psoriasis stable + response to topical tx rarely needed lately\par - IBS C + IBS C daily probiotic + response..and Ibguard..\par - still exercises, energy level excellent, ros- otherwise neg \par Stable..elevated Hb w/ macrocytosis.. seen by Dr. Andres no treatment or additional studies beyond labs - BM not indicated\par \par 1) Psoriasis ? PsA\par 2) IBS-C\par 3) Macrocytosis w/ polycythemia\par 4) Osteoporosis \par ______________________________________________________________________________\par \par (Initial HPI 10/20) \par 68 yo wf  w/ hx of psoriasis with recent increase in joint pain involving  knees, hands, R ankle (old fracture, intermittent swelling),  and most significantly R shoulder mild intermittent ... \par Stiffness in am less than 30 mins \par Hx of plantar fascitis, occas shoulder bursitis.. with RTC partial tear (confirmed MRI w/ diffuse inflammatory changes)  and bicep tear R arm.. 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 seen by Dr. Andres no treatment or additional studies beyond labs - BM not indicated \par + IBS C daily probiotic + response..and Ibguard..\par  \par Occas numbness w/ raynauds changes for many no digital ulcerations.  \par \par Mild HTN \par Age appropriate malignancy screening:  \par colonoscopy/ endoscopy, mammogram, pap 2020  smear, CXR\par Osteoporosis 2019 updated.. severe \par \par +FH mother psoriasis.. .

## 2022-01-28 NOTE — ASSESSMENT
[FreeTextEntry1] : 69 yo wf  healthy, mild HTN, polycythemia w/ macrocytosis, osteoporosis.. well controlled and small patch psoriasis - scalp with c/o joint pain.  \par \par 1) Psoriasis possible PsA: presents with increase c/o stiffness and several PIP joints today full/ boggy and ttt w/ stiffness in knees and R ankle.  \par Psoriasis well controlled.. \par Will try 2 wk 10 mg pred - increase to 20 mg if necessary.. if dramatic improvement would consider early tx w/ SSA/ or Otezla.. \par If not will continue to tx regional pain PRN.. \par Continues w/ topical NSAIds/ CBD with some improvement \par No personal  AS, inflammatory back, bowel or eye dz- but will get SI films / HLAB27 now\par NOTE: \par Hx significant R shoulder RTC pathology and tendinosis and recent rupture biceps tendon.. but exercises/ heavy lifting and thought to be r/t... no other spontaneous sx.  Occas sx of plantar fascitis\par + FH mother also w/ Psoriasis  \par - previous NSAIDs.. \par \par 2) OA:  diffusely, mild with no deformities and fROM throughout\par \par 3) IBS-C \par + IBS C daily probiotic + response..and Ibguard..\par \par 4) Raynauds phenomenon:  mild, for many ys w/ little in H & P to suggest CTD.  Denies mucositis, serositis (no cardiopulmonary, HSM), no renal dysfunction, rash, alopecia, cytopenias, bleeding or clotting dyscrasias\par \par 5) Osteoporosis:  on Actonel monthly but admits to poor compliance though well tolerated. Updated study 11/20 scheduled.. Dr. Jose (need to review) \par Taking Vit D and routine wt bearing activity\par Stable wt x many ys w/ BMI 21\par Minimal other risk factors \par L wrist fracture - traumatically induced post menopausal/ R ankle fracture- trauma premenopausal \par \par Age appropriate malignancy screening:  \par colonoscopy/ endoscopy, mammogram 2020 neg, pap 2020, no recent  CXR\par Osteoporosis 2019 updated.. severe \par \par Plan: \par - start  Otezla and stop MTX \par \par - trial of Ibuprofen 600 mg NMT 2 x daily and do not use ibuprofen. Needs home monitoring of HTN and recommend holding ibuprofen if DBP > 90...  and again avoid Advil. Ok to supplement as needed w/ APAP nmt 2000 mg/d  \par \par - labs now \par \par - need to see Updated dXA \par \par - OK to continue  Voltaren gel / and or CBD \par \par - TEL 2 m to assess response to Otezla and  office visit in 4 m \par \par -Is aware to call if there is any change in her underlying symptoms.\par

## 2022-01-28 NOTE — PHYSICAL EXAM
[General Appearance - Alert] : alert [General Appearance - In No Acute Distress] : in no acute distress [General Appearance - Well Nourished] : well nourished [General Appearance - Well Developed] : well developed [General Appearance - Well-Appearing] : healthy appearing [Sclera] : the sclera and conjunctiva were normal [Outer Ear] : the ears and nose were normal in appearance [Nasal Cavity] : the nasal mucosa and septum were normal [] : no respiratory distress [Auscultation Breath Sounds / Voice Sounds] : lungs were clear to auscultation bilaterally [Heart Rate And Rhythm] : heart rate was normal and rhythm regular [Heart Sounds] : normal S1 and S2 [Heart Sounds Gallop] : no gallops [Murmurs] : no murmurs [Heart Sounds Pericardial Friction Rub] : no pericardial rub [Full Pulse] : the pedal pulses are present [Edema] : there was no peripheral edema [Cervical Lymph Nodes Enlarged Posterior Bilaterally] : posterior cervical [Cervical Lymph Nodes Enlarged Anterior Bilaterally] : anterior cervical [Supraclavicular Lymph Nodes Enlarged Bilaterally] : supraclavicular [No CVA Tenderness] : no ~M costovertebral angle tenderness [No Spinal Tenderness] : no spinal tenderness [Abnormal Walk] : normal gait [Nail Clubbing] : no clubbing  or cyanosis of the fingernails [Musculoskeletal - Swelling] : no joint swelling seen [Motor Tone] : muscle strength and tone were normal [Deep Tendon Reflexes (DTR)] : deep tendon reflexes were 2+ and symmetric [Sensation] : the sensory exam was normal to light touch and pinprick [No Focal Deficits] : no focal deficits [Oriented To Time, Place, And Person] : oriented to person, place, and time [Impaired Insight] : insight and judgment were intact [Affect] : the affect was normal [FreeTextEntry1] : anxious about treatment

## 2022-01-28 NOTE — DATA REVIEWED
[FreeTextEntry1] : LabS: \par 8/21 neg HLAB27\par \par Other Diagnostics: \par 8/21  Neg SI joints, hands neg for erosions.. \par MRI R shoulder 8/20 w/ tendinosis and RTC pathology, AC arthritis and small GH effusion, biceps tendinosis

## 2022-01-28 NOTE — REVIEW OF SYSTEMS
[Arthralgias] : arthralgias [Joint Pain] : joint pain [As Noted in HPI] : as noted in HPI [Skin Lesions] : skin lesion [Negative] : Heme/Lymph [FreeTextEntry3] : visual loss R eye long standing and denies inflammatory eye dz [FreeTextEntry9] : no synovitis, fROM  [de-identified] : scalp psoriasis

## 2022-02-14 LAB
ALBUMIN SERPL ELPH-MCNC: 4.7 G/DL
ALP BLD-CCNC: 81 U/L
ALT SERPL-CCNC: 19 U/L
ANION GAP SERPL CALC-SCNC: 12 MMOL/L
AST SERPL-CCNC: 28 U/L
BASOPHILS # BLD AUTO: 0.07 K/UL
BASOPHILS NFR BLD AUTO: 1.5 %
BILIRUB SERPL-MCNC: 1 MG/DL
BUN SERPL-MCNC: 22 MG/DL
CALCIUM SERPL-MCNC: 9.5 MG/DL
CHLORIDE SERPL-SCNC: 102 MMOL/L
CO2 SERPL-SCNC: 25 MMOL/L
CREAT SERPL-MCNC: 0.81 MG/DL
CRP SERPL-MCNC: <3 MG/L
EOSINOPHIL # BLD AUTO: 0.07 K/UL
EOSINOPHIL NFR BLD AUTO: 1.5 %
ERYTHROCYTE [SEDIMENTATION RATE] IN BLOOD BY WESTERGREN METHOD: 13 MM/HR
GLUCOSE SERPL-MCNC: 94 MG/DL
HCT VFR BLD CALC: 40.5 %
HGB BLD-MCNC: 13.7 G/DL
IMM GRANULOCYTES NFR BLD AUTO: 0.2 %
LYMPHOCYTES # BLD AUTO: 1.09 K/UL
LYMPHOCYTES NFR BLD AUTO: 23.4 %
MAN DIFF?: NORMAL
MCHC RBC-ENTMCNC: 33.7 PG
MCHC RBC-ENTMCNC: 33.8 GM/DL
MCV RBC AUTO: 99.8 FL
MONOCYTES # BLD AUTO: 0.41 K/UL
MONOCYTES NFR BLD AUTO: 8.8 %
NEUTROPHILS # BLD AUTO: 3.01 K/UL
NEUTROPHILS NFR BLD AUTO: 64.6 %
PLATELET # BLD AUTO: 231 K/UL
POTASSIUM SERPL-SCNC: 4.1 MMOL/L
PROT SERPL-MCNC: 6.9 G/DL
RBC # BLD: 4.06 M/UL
RBC # FLD: 13.1 %
SODIUM SERPL-SCNC: 139 MMOL/L
WBC # FLD AUTO: 4.66 K/UL

## 2022-02-18 ENCOUNTER — APPOINTMENT (OUTPATIENT)
Dept: RHEUMATOLOGY | Facility: CLINIC | Age: 72
End: 2022-02-18
Payer: COMMERCIAL

## 2022-02-18 VITALS
DIASTOLIC BLOOD PRESSURE: 80 MMHG | WEIGHT: 112 LBS | HEIGHT: 60 IN | BODY MASS INDEX: 21.99 KG/M2 | HEART RATE: 68 BPM | OXYGEN SATURATION: 96 % | SYSTOLIC BLOOD PRESSURE: 130 MMHG | TEMPERATURE: 97.2 F

## 2022-02-18 PROCEDURE — 99213 OFFICE O/P EST LOW 20 MIN: CPT

## 2022-02-18 RX ORDER — APREMILAST 10-20-30MG
10 & 20 & 30 KIT ORAL
Qty: 1 | Refills: 3 | Status: DISCONTINUED | COMMUNITY
Start: 2022-02-18 | End: 2022-02-18

## 2022-02-19 RX ORDER — APREMILAST 10-20-30MG
10 & 20 & 30 KIT ORAL
Qty: 1 | Refills: 3 | Status: DISCONTINUED | COMMUNITY
Start: 2022-02-18 | End: 2022-02-19

## 2022-02-19 RX ORDER — IBUPROFEN 600 MG/1
600 TABLET, FILM COATED ORAL
Qty: 60 | Refills: 1 | Status: DISCONTINUED | COMMUNITY
Start: 2022-01-28 | End: 2022-02-19

## 2022-02-19 NOTE — REVIEW OF SYSTEMS
[Arthralgias] : arthralgias [Joint Pain] : joint pain [As Noted in HPI] : as noted in HPI [Skin Lesions] : skin lesion [Negative] : Heme/Lymph [FreeTextEntry3] : visual loss R eye long standing and denies inflammatory eye dz [FreeTextEntry9] : no synovitis, fROM  [de-identified] : scalp psoriasis

## 2022-02-19 NOTE — HISTORY OF PRESENT ILLNESS
[FreeTextEntry1] : 20\par - again excellent and nearly full resolution of all joint pain and psoriasis with steroids.  Did not tolerate MTX at all.. \par and didn't feel it was doing anything. TOtay b/l lateral epicondylitis, biceps tendinitis, mild \par - no response to NSAIDs.. \par - baseline studies  neg HLAB27, and SI joints, hands neg for erosions..   \par \par 1) Psoriasis ? PsA\par 2) IBS-C\par 3) Macrocytosis w/ polycythemia\par 4) Osteoporosis \par ______________________________________________________________________________\par \par (Initial HPI 10/20) \par 68 yo wf  w/ hx of psoriasis with recent increase in joint pain involving  knees, hands, R ankle (old fracture, intermittent swelling),  and most significantly R shoulder mild intermittent ... \par Stiffness in am less than 30 mins \par Hx of plantar fascitis, occas shoulder bursitis.. with RTC partial tear (confirmed MRI w/ diffuse inflammatory changes)  and bicep tear R arm.. 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 seen by Dr. Andres no treatment or additional studies beyond labs - BM not indicated \par + IBS C daily probiotic + response..and Ibguard..\par  \par Occas numbness w/ raynauds changes for many no digital ulcerations.  \par \par Mild HTN \par Age appropriate malignancy screening:  \par colonoscopy/ endoscopy, mammogram, pap 2020  smear, CXR\par Osteoporosis 2019 updated.. severe \par \par +FH mother psoriasis.. .

## 2022-02-19 NOTE — ASSESSMENT
[FreeTextEntry1] : 70 yo wf  healthy, mild HTN, polycythemia w/ macrocytosis, osteoporosis.. well controlled and small patch psoriasis w/ recurrent enthesopathies  \par \par 1) Psoriasis possible PsA: presents with increase c/o stiffness and several PIP joints today full/ boggy and ttt w/ stiffness in knees and R ankle, biceps tendinopathy and bl lateral epicondyles and intermittent plantar fascitis ... .  \par Psoriasis well controlled.. \par Now on 1 wk course of steroids for severe discomfort, always effective, trying to get OTezla approved.. sample starter pack provided, but advised not to start unless approved.  If not covered could start with Humira/ Cosentyx but would rather start with oral tx and less intensive tx.   Did provide literature on several other options.\par Continues w/ topical NSAIds/ CBD with some improvement \par No personal  AS, inflammatory back, bowel or eye dz- but will get SI films / HLAB27 now\par NOTE: \par - failed to tolerate MTX, hair loss and no improvement\par - R shoulder RTC pathology and tendinosis and recent rupture biceps tendon.. but exercises/ heavy lifting and thought to be r/t...\par + FH mother w/ Psoriasis  \par - previous NSAIDs limited benefit\par \par 2) OA:  diffusely, mild with no deformities and fROM throughout\par \par 3) IBS-C: controlled and worried that any tx for PsA may worsen, will need to monitor  \par + IBS C daily probiotic + response..and Ibguard..\par \par 4) Raynauds phenomenon:  mild, for many ys w/ little in H & P to suggest CTD.  Denies mucositis, serositis (no cardiopulmonary, HSM), no renal dysfunction, rash, alopecia, cytopenias, bleeding or clotting dyscrasias\par \par 5) Osteoporosis:  on Actonel monthly but admits to poor compliance though well tolerated. Updated study 11/20 scheduled.. Dr. Jose (need to review) \par Taking Vit D and routine wt bearing activity\par Stable wt x many ys w/ BMI 21\par Minimal other risk factors \par L wrist fracture - traumatically induced post menopausal/ R ankle fracture- trauma premenopausal \par \par Age appropriate malignancy screening:  \par colonoscopy/ endoscopy, mammogram 2020 neg, pap 2020, no recent  CXR\par Osteoporosis 2019 updated.. severe \par \par Plan: \par - start  Otezla  asap.. \par \par - use avoid Advil NMT 1200 mg daily,  Ok to supplement as needed w/ APAP nmt 2000 mg/d  \par \par - need to see Updated dXA and discuss tx options.. given poor compliance with oral tx.. \par \par - OK to continue  Voltaren gel / and or CBD \par \par - TEL 2 m to assess response to Otezla and  office visit in 4 m \par \par -Is aware to call if there is any change in her underlying symptoms.\par

## 2022-02-19 NOTE — PHYSICAL EXAM
[General Appearance - Alert] : alert [General Appearance - In No Acute Distress] : in no acute distress [General Appearance - Well Nourished] : well nourished [General Appearance - Well Developed] : well developed [General Appearance - Well-Appearing] : healthy appearing [Sclera] : the sclera and conjunctiva were normal [Outer Ear] : the ears and nose were normal in appearance [Nasal Cavity] : the nasal mucosa and septum were normal [] : no respiratory distress [Auscultation Breath Sounds / Voice Sounds] : lungs were clear to auscultation bilaterally [Heart Rate And Rhythm] : heart rate was normal and rhythm regular [Heart Sounds] : normal S1 and S2 [Heart Sounds Gallop] : no gallops [Murmurs] : no murmurs [Heart Sounds Pericardial Friction Rub] : no pericardial rub [Edema] : there was no peripheral edema [Full Pulse] : the pedal pulses are present [Cervical Lymph Nodes Enlarged Posterior Bilaterally] : posterior cervical [Cervical Lymph Nodes Enlarged Anterior Bilaterally] : anterior cervical [Supraclavicular Lymph Nodes Enlarged Bilaterally] : supraclavicular [No CVA Tenderness] : no ~M costovertebral angle tenderness [No Spinal Tenderness] : no spinal tenderness [Abnormal Walk] : normal gait [Musculoskeletal - Swelling] : no joint swelling seen [Nail Clubbing] : no clubbing  or cyanosis of the fingernails [Motor Tone] : muscle strength and tone were normal [Deep Tendon Reflexes (DTR)] : deep tendon reflexes were 2+ and symmetric [Sensation] : the sensory exam was normal to light touch and pinprick [No Focal Deficits] : no focal deficits [Oriented To Time, Place, And Person] : oriented to person, place, and time [Impaired Insight] : insight and judgment were intact [Affect] : the affect was normal [FreeTextEntry1] : anxious about treatment and frustrated about delay

## 2022-02-28 ENCOUNTER — TRANSCRIPTION ENCOUNTER (OUTPATIENT)
Age: 72
End: 2022-02-28

## 2022-03-30 ENCOUNTER — APPOINTMENT (OUTPATIENT)
Dept: RHEUMATOLOGY | Facility: CLINIC | Age: 72
End: 2022-03-30

## 2022-03-30 ENCOUNTER — APPOINTMENT (OUTPATIENT)
Dept: RHEUMATOLOGY | Facility: CLINIC | Age: 72
End: 2022-03-30
Payer: COMMERCIAL

## 2022-03-30 PROCEDURE — 99442: CPT

## 2022-04-04 ENCOUNTER — TRANSCRIPTION ENCOUNTER (OUTPATIENT)
Age: 72
End: 2022-04-04

## 2022-05-25 ENCOUNTER — APPOINTMENT (OUTPATIENT)
Dept: RHEUMATOLOGY | Facility: CLINIC | Age: 72
End: 2022-05-25
Payer: COMMERCIAL

## 2022-05-25 VITALS
WEIGHT: 104 LBS | HEART RATE: 77 BPM | OXYGEN SATURATION: 100 % | BODY MASS INDEX: 20.31 KG/M2 | DIASTOLIC BLOOD PRESSURE: 70 MMHG | SYSTOLIC BLOOD PRESSURE: 122 MMHG | TEMPERATURE: 97.1 F

## 2022-05-25 PROCEDURE — 99213 OFFICE O/P EST LOW 20 MIN: CPT

## 2022-05-25 NOTE — DATA REVIEWED
IPack    Start time: 2021 9:23 AM  End time: 2021 9:39 AM  Performed by: Delfina Ramirez MD  Authorized by: Delfina Ramirez MD       Pre-procedure: Indications: at surgeon's request and post-op pain management    Preanesthetic Checklist: patient identified, risks and benefits discussed, site marked, timeout performed, anesthesia consent given and patient being monitored      Block Type:   Block Type:  Ipack  Monitoring:  Standard ASA monitoring, continuous pulse ox, frequent vital sign checks, heart rate, responsive to questions and oxygen  Injection Technique:  Single shot  Procedures: ultrasound guided    Patient Position: supine  Prep: chlorhexidine    Needle Type:  Stimuplex  Needle Gauge:  22 G  Medication Injected:  Ropivacaine (NAROPIN) 2 mg/mL (0.2 %) injection, 20 mg  Med Admin Time: 2021 9:38 AM    Assessment:  Number of attempts:  1  Injection Assessment:  Incremental injection every 5 mL, local visualized surrounding nerve on ultrasound, negative aspiration for CSF, negative aspiration for blood, no paresthesia, no intravascular symptoms and ultrasound image on chart  Patient tolerance:  Patient tolerated the procedure well with no immediate complications  tyron Bauer   = [id number]  CSN = [id number]    Ultrasound image to determine optimal needle insertion site. Sterile prep chlorhexidine. A   mm block needle inserted laterally. Guided directly with ultrasound . Local anesthetic injected with aspiration every 5 ml, proximal to femoral condyles distributed across width of femur. Entire procedure performed prior to anesthesia start time. No blood was noted. No complications. VSS throughout.     tyron Bauer   = [id number]  CSN = [id number] [FreeTextEntry1] : LabS: \par 8/21 neg HLAB27\par \par Other Diagnostics: \par 8/21  Neg SI joints, hands neg for erosions.. \par MRI R shoulder 8/20 w/ tendinosis and RTC pathology, AC arthritis and small GH effusion, biceps tendinosis

## 2022-05-25 NOTE — HISTORY OF PRESENT ILLNESS
[FreeTextEntry1] : 20\par - excellent and full response to Otezla fs.. tolerated fairly well with only mild GI SE managed with timing. \par - minimal am stiffnss - less than 10 mins, rash fully controlled.  \par - no need for NSAIDs.. \par - baseline studies  neg HLAB27, and SI joints, hands neg for erosions..   \par \par 1) Psoriasis ? PsA\par 2) IBS-C\par 3) Macrocytosis w/ polycythemia\par 4) Osteoporosis \par ______________________________________________________________________________\par \par (Initial HPI 10/20) \par 68 yo wf  w/ hx of psoriasis with recent increase in joint pain involving  knees, hands, R ankle (old fracture, intermittent swelling),  and most significantly R shoulder mild intermittent ... \par Stiffness in am less than 30 mins \par Hx of plantar fascitis, occas shoulder bursitis.. with RTC partial tear (confirmed MRI w/ diffuse inflammatory changes)  and bicep tear R arm.. 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 seen by Dr. Andres no treatment or additional studies beyond labs - BM not indicated \par + IBS C daily probiotic + response..and Ibguard..\par  \par Occas numbness w/ raynauds changes for many no digital ulcerations.  \par \par Mild HTN \par Age appropriate malignancy screening:  \par colonoscopy/ endoscopy, mammogram, pap 2020  smear, CXR\par Osteoporosis 2019 updated.. severe \par \par +FH mother psoriasis.. .

## 2022-05-25 NOTE — ASSESSMENT
[FreeTextEntry1] : 72 yo wf  healthy, mild HTN, polycythemia w/ macrocytosis, osteoporosis.. well controlled and small patch psoriasis w/ recurrent enthesopathies  \par \par 1) Psoriasis possible PsA: presented with increase c/o stiffness and several PIP joints today full/ boggy and ttt w/ stiffness in knees and R ankle, biceps tendinopathy and bl lateral epicondyles and intermittent plantar fascitis ... .  \par Psoriasis on scalp.. trial of MTX not tolerated, now on Otezla with + rsponse and full control.  Well tolerated with only mild GI distress, not bothered and adjusted timing of dose w/ + response \par Continues w/ topical NSAIds/ CBD with some improvement PRN, rarely needed lately\par has NO limitations, doing everything to include wt lifting/ working FT (due to retire june)\par No personal  AS, inflammatory back, bowel or eye dz- but will get SI films / HLAB27 now\par NOTE: \par - failed to tolerate MTX, hair loss and no improvement\par - R shoulder RTC pathology and tendinosis and recent rupture biceps tendon.. but exercises/ heavy lifting and thought to be r/t...\par + FH mother w/ Psoriasis  \par - previous NSAIDs limited benefit\par \par 2) OA:  diffusely, mild with no deformities and fROM throughout\par \par 3) IBS-C: controlled and worried that any tx for PsA may worsen, will need to monitor  \par + IBS C daily probiotic + response..and IBGuard \par \par 4) Raynauds phenomenon:  mild, for many ys w/ little in H & P to suggest CTD.  Denies mucositis, serositis (no cardiopulmonary, HSM), no renal dysfunction, rash, alopecia, cytopenias, bleeding or clotting dyscrasias\par \par 5) Osteoporosis:  on Actonel monthly but admits to poor compliance though well tolerated. Updated study 11/20 scheduled.. Dr. Jose (need to review) \par Taking Vit D and routine wt bearing activity\par Stable wt x many ys w/ BMI 21\par Minimal other risk factors \par L wrist fracture - traumatically induced post menopausal/ R ankle fracture- trauma premenopausal \par \par Age appropriate malignancy screening:  \par colonoscopy/ endoscopy, mammogram 2020 neg, pap 2020, no recent  CXR\par Osteoporosis 2019 updated.. severe \par \par Plan: \par - continue Otezla  asap.. \par \par - use Advil NMT 1200 mg daily,  Ok to supplement as needed w/ APAP nmt 2000 mg/d  \par \par - need to see Updated dXA and discuss tx options.. given poor compliance with oral tx.. \par \par - OK to continue  Voltaren gel / and or CBD \par \par - RPA 6 m\par \par -Is aware to call if there is any change in her underlying symptoms. \par

## 2022-05-25 NOTE — PHYSICAL EXAM
[General Appearance - Alert] : alert [General Appearance - In No Acute Distress] : in no acute distress [General Appearance - Well Nourished] : well nourished [General Appearance - Well Developed] : well developed [General Appearance - Well-Appearing] : healthy appearing [Sclera] : the sclera and conjunctiva were normal [Outer Ear] : the ears and nose were normal in appearance [Nasal Cavity] : the nasal mucosa and septum were normal [] : no respiratory distress [Auscultation Breath Sounds / Voice Sounds] : lungs were clear to auscultation bilaterally [Heart Rate And Rhythm] : heart rate was normal and rhythm regular [Heart Sounds] : normal S1 and S2 [Heart Sounds Gallop] : no gallops [Murmurs] : no murmurs [Heart Sounds Pericardial Friction Rub] : no pericardial rub [Full Pulse] : the pedal pulses are present [Edema] : there was no peripheral edema [No CVA Tenderness] : no ~M costovertebral angle tenderness [No Spinal Tenderness] : no spinal tenderness [Abnormal Walk] : normal gait [Nail Clubbing] : no clubbing  or cyanosis of the fingernails [Musculoskeletal - Swelling] : no joint swelling seen [Motor Tone] : muscle strength and tone were normal [Sensation] : the sensory exam was normal to light touch and pinprick [No Focal Deficits] : no focal deficits [Oriented To Time, Place, And Person] : oriented to person, place, and time [Impaired Insight] : insight and judgment were intact [Affect] : the affect was normal [FreeTextEntry1] : feeling much better with + response to Tx

## 2022-05-25 NOTE — REVIEW OF SYSTEMS
[Arthralgias] : arthralgias [Joint Pain] : joint pain [As Noted in HPI] : as noted in HPI [Skin Lesions] : skin lesion [Negative] : Heme/Lymph [FreeTextEntry3] : visual loss R eye long standing and denies inflammatory eye dz [FreeTextEntry9] : no synovitis, fROM  [de-identified] : scalp psoriasis

## 2022-06-08 ENCOUNTER — NON-APPOINTMENT (OUTPATIENT)
Age: 72
End: 2022-06-08

## 2022-06-14 ENCOUNTER — RESULT REVIEW (OUTPATIENT)
Age: 72
End: 2022-06-14

## 2022-06-15 ENCOUNTER — TRANSCRIPTION ENCOUNTER (OUTPATIENT)
Age: 72
End: 2022-06-15

## 2022-06-24 ENCOUNTER — APPOINTMENT (OUTPATIENT)
Dept: RHEUMATOLOGY | Facility: CLINIC | Age: 72
End: 2022-06-24
Payer: COMMERCIAL

## 2022-06-24 VITALS
SYSTOLIC BLOOD PRESSURE: 120 MMHG | HEIGHT: 60 IN | BODY MASS INDEX: 20.42 KG/M2 | TEMPERATURE: 97.7 F | OXYGEN SATURATION: 98 % | WEIGHT: 104 LBS | HEART RATE: 73 BPM | DIASTOLIC BLOOD PRESSURE: 70 MMHG

## 2022-06-24 DIAGNOSIS — R17 UNSPECIFIED JAUNDICE: ICD-10-CM

## 2022-06-24 PROCEDURE — 36415 COLL VENOUS BLD VENIPUNCTURE: CPT

## 2022-06-24 PROCEDURE — 99214 OFFICE O/P EST MOD 30 MIN: CPT | Mod: 25

## 2022-06-24 RX ORDER — METRONIDAZOLE 7.5 MG/G
0.75 GEL VAGINAL
Qty: 70 | Refills: 0 | Status: ACTIVE | COMMUNITY
Start: 2022-06-01

## 2022-06-26 PROBLEM — R17 TOTAL BILIRUBIN, ELEVATED: Status: ACTIVE | Noted: 2022-06-26

## 2022-06-26 NOTE — ASSESSMENT
[FreeTextEntry1] : 70 yo wf  healthy, mild HTN, polycythemia w/ macrocytosis, osteoporosis.. well controlled and small patch psoriasis w/ recurrent enthesopathies  \par \par 1) Psoriasis possible PsA: presented with increase c/o stiffness and several PIP joints full/ boggy and ttt w/ stiffness in knees and R ankle, biceps tendinopathy and bl lateral epicondyles and intermittent plantar fascitis ... .  \par Psoriasis on scalp.. trial of MTX not tolerated, now on Otezla full control of rash and nearly complete control of joint swelling.  C/o pain worse in hands but consistently worse at end of day w/ marked OA changes throughout- + response to NSAIDs but increased GI distress.  Also concerned about GI distress from Otezla, GI sx absolutely feels better when misses dose but joint sx worse.. need to consider alternate therapy. \par Again lengthy discussion tx options would like self injection, advised trial and error and  iniital tx doesn't work will consider another, encouraged not to get discouraged.  Given joint c/o > skin will start with TNFi.. Humira.\par Reviewed R/ B/SE and understands, will call if any issues or concerns, literature provided after discussing difference between  TNFi vs IL17.   \par Continues w/ topical NSAIds/ CBD with some improvement PRN\par FUnctionally still has NO limitations, doing everything to include wt lifting/ working FT (due to retire june)\par Labs 6/22 nl CBC, CMP- exept slight increase in TB 1.2 (from 0.9- likely due to increase in NSAIDS, ESR, CRP, \par No personal  AS, inflammatory back, bowel or eye dz- but will get SI films / HLAB27 now\par NOTE: \par - Otezla effective controlling skin and nearly full control of joints but severe GI distress\par - failed to tolerate MTX, hair loss and no improvement\par - R shoulder RTC pathology and tendinosis and recent rupture biceps tendon.. but exercises/ heavy lifting and thought to be r/t...\par + FH mother w/ Psoriasis  \par - previous NSAIDs limited benefit\par \par 2) OA:  diffusely, mild with no deformities and fROM throughout\par \par 3) IBS-C: controlled and worried that any tx for PsA may worsen, will need to monitor  \par + IBS C daily probiotic + response..and IBGuard \par \par 4) Raynauds phenomenon:  mild, for many ys w/ little in H & P to suggest CTD.  Denies mucositis, serositis (no cardiopulmonary, HSM), no renal dysfunction, rash, alopecia, cytopenias, bleeding or clotting dyscrasias\par \par 5) Osteoporosis:  on Actonel monthly but admits to poor compliance though well tolerated. Updated study 11/20 scheduled.. Dr. Jose (need to review) \par Taking Vit D and routine wt bearing activity\par Stable wt x many ys w/ BMI 21\par Minimal other risk factors \par L wrist fracture - traumatically induced post menopausal/ R ankle fracture- trauma premenopausal \par \par 6) Elevated bili:  progressive increase over past few mnths, w/ nl LFTs.  Need to monitor closely \par Hep B/C negative 6/22 \par \par Age appropriate malignancy screening:  \par colonoscopy/ endoscopy, mammogram 2020 neg, pap 2020, no recent  CXR\par Osteoporosis 2019 updated.. severe \par \par Plan: \par - continue Otezla till approved for Humira then come in for instruction self injection \par \par - TB slightly elevated likely r/t high intake NSAIDs/ APAP, needs to minimize, ideally with better control of PsA intake will decrease, will need to follow closely \par \par - needs Hep b/c and QFT now.. \par \par - use Advil NMT 1200 mg daily,  Ok to supplement as needed w/ APAP nmt 2000 mg/d  \par \par - need to see Updated dXA and discuss tx options.. given poor compliance with oral tx.. \par \par - OK to continue  Voltaren gel / and or CBD \par \par - RPA 6 m\par \par -Is aware to call if there is any change in her underlying symptoms. \par

## 2022-06-26 NOTE — PHYSICAL EXAM
[General Appearance - Alert] : alert [General Appearance - In No Acute Distress] : in no acute distress [General Appearance - Well Nourished] : well nourished [General Appearance - Well Developed] : well developed [General Appearance - Well-Appearing] : healthy appearing [Sclera] : the sclera and conjunctiva were normal [Outer Ear] : the ears and nose were normal in appearance [Nasal Cavity] : the nasal mucosa and septum were normal [Auscultation Breath Sounds / Voice Sounds] : lungs were clear to auscultation bilaterally [Heart Rate And Rhythm] : heart rate was normal and rhythm regular [Heart Sounds] : normal S1 and S2 [Heart Sounds Gallop] : no gallops [Murmurs] : no murmurs [Heart Sounds Pericardial Friction Rub] : no pericardial rub [Full Pulse] : the pedal pulses are present [Edema] : there was no peripheral edema [No CVA Tenderness] : no ~M costovertebral angle tenderness [No Spinal Tenderness] : no spinal tenderness [Abnormal Walk] : normal gait [Nail Clubbing] : no clubbing  or cyanosis of the fingernails [Musculoskeletal - Swelling] : no joint swelling seen [Motor Tone] : muscle strength and tone were normal [Sensation] : the sensory exam was normal to light touch and pinprick [No Focal Deficits] : no focal deficits [Oriented To Time, Place, And Person] : oriented to person, place, and time [Impaired Insight] : insight and judgment were intact [Affect] : the affect was normal [] : the neck was supple [FreeTextEntry1] : feeling much better with + response to Tx

## 2022-06-26 NOTE — HISTORY OF PRESENT ILLNESS
[FreeTextEntry1] : 20\par - overall hand pain has returned somewhat.. absolutely worse at end of day but some stiffness am.. at times several hours though does NOT limit any activity.  Able to do everything.. NSAIDs 400-600 mg Advil some relief but only worsens GI distress \par - no return of rash, psoriasis fully controlled \par - still w/ GI issues.. absolutely worse on Otezla, but notes increased pain/ stiffness when misses dose.  \par - baseline studies  neg HLAB27, and SI joints, hands neg for erosions..   \par \par 1) Psoriasis ? PsA\par 2) IBS-C\par 3) Macrocytosis w/ polycythemia\par 4) Osteoporosis \par ______________________________________________________________________________\par \par (Initial HPI 10/20) \par 70 yo wf  w/ hx of psoriasis with recent increase in joint pain involving  knees, hands, R ankle (old fracture, intermittent swelling),  and most significantly R shoulder mild intermittent ... \par Stiffness in am less than 30 mins \par Hx of plantar fascitis, occas shoulder bursitis.. with RTC partial tear (confirmed MRI w/ diffuse inflammatory changes)  and bicep tear R arm.. 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 seen by Dr. Andrse no treatment or additional studies beyond labs - BM not indicated \par + IBS C daily probiotic + response..and Ibguard..\par  \par Occas numbness w/ raynauds changes for many no digital ulcerations.  \par \par Mild HTN \par Age appropriate malignancy screening:  \par colonoscopy/ endoscopy, mammogram, pap 2020  smear, CXR\par Osteoporosis 2019 updated.. severe \par \par +FH mother psoriasis.. .

## 2022-06-26 NOTE — REVIEW OF SYSTEMS
[Arthralgias] : arthralgias [Joint Pain] : joint pain [As Noted in HPI] : as noted in HPI [Skin Lesions] : skin lesion [Negative] : Heme/Lymph [FreeTextEntry3] : visual loss R eye long standing and denies inflammatory eye dz [FreeTextEntry9] : pain and deformity but fROM  [de-identified] : scalp psoriasis- resolved

## 2022-07-04 LAB
ALBUMIN SERPL ELPH-MCNC: 3.9 G/DL
ALP BLD-CCNC: 81 U/L
ALT SERPL-CCNC: 20 U/L
ANION GAP SERPL CALC-SCNC: 10 MMOL/L
AST SERPL-CCNC: 26 U/L
BASOPHILS # BLD AUTO: 0.06 K/UL
BASOPHILS NFR BLD AUTO: 0.9 %
BILIRUB SERPL-MCNC: 1.3 MG/DL
BUN SERPL-MCNC: 17 MG/DL
CALCIUM SERPL-MCNC: 9.1 MG/DL
CHLORIDE SERPL-SCNC: 104 MMOL/L
CO2 SERPL-SCNC: 26 MMOL/L
CREAT SERPL-MCNC: 0.75 MG/DL
CRP SERPL-MCNC: <3 MG/L
EGFR: 84 ML/MIN/1.73M2
EOSINOPHIL # BLD AUTO: 0.04 K/UL
EOSINOPHIL NFR BLD AUTO: 0.6 %
ERYTHROCYTE [SEDIMENTATION RATE] IN BLOOD BY WESTERGREN METHOD: 8 MM/HR
GLUCOSE SERPL-MCNC: 97 MG/DL
HAV IGM SER QL: NONREACTIVE
HBV CORE IGM SER QL: NONREACTIVE
HBV SURFACE AG SER QL: NONREACTIVE
HCT VFR BLD CALC: 38.8 %
HCV AB SER QL: NONREACTIVE
HCV S/CO RATIO: 0.09 S/CO
HGB BLD-MCNC: 12.7 G/DL
IMM GRANULOCYTES NFR BLD AUTO: 0.3 %
LYMPHOCYTES # BLD AUTO: 2.19 K/UL
LYMPHOCYTES NFR BLD AUTO: 34.1 %
MAN DIFF?: NORMAL
MCHC RBC-ENTMCNC: 32.3 PG
MCHC RBC-ENTMCNC: 32.7 GM/DL
MCV RBC AUTO: 98.7 FL
MONOCYTES # BLD AUTO: 0.49 K/UL
MONOCYTES NFR BLD AUTO: 7.6 %
NEUTROPHILS # BLD AUTO: 3.63 K/UL
NEUTROPHILS NFR BLD AUTO: 56.5 %
PLATELET # BLD AUTO: 212 K/UL
POTASSIUM SERPL-SCNC: 3.8 MMOL/L
PROT SERPL-MCNC: 6 G/DL
RBC # BLD: 3.93 M/UL
RBC # FLD: 12.1 %
SODIUM SERPL-SCNC: 140 MMOL/L
WBC # FLD AUTO: 6.43 K/UL

## 2022-07-19 ENCOUNTER — NON-APPOINTMENT (OUTPATIENT)
Age: 72
End: 2022-07-19

## 2022-07-22 ENCOUNTER — APPOINTMENT (OUTPATIENT)
Dept: RHEUMATOLOGY | Facility: CLINIC | Age: 72
End: 2022-07-22

## 2022-08-14 ENCOUNTER — NON-APPOINTMENT (OUTPATIENT)
Age: 72
End: 2022-08-14

## 2022-08-18 ENCOUNTER — APPOINTMENT (OUTPATIENT)
Dept: RHEUMATOLOGY | Facility: CLINIC | Age: 72
End: 2022-08-18

## 2022-08-18 VITALS
DIASTOLIC BLOOD PRESSURE: 68 MMHG | TEMPERATURE: 97.6 F | WEIGHT: 103.5 LBS | BODY MASS INDEX: 20.21 KG/M2 | SYSTOLIC BLOOD PRESSURE: 110 MMHG | HEART RATE: 67 BPM | OXYGEN SATURATION: 97 %

## 2022-08-18 PROCEDURE — 99213 OFFICE O/P EST LOW 20 MIN: CPT

## 2022-08-18 NOTE — ASSESSMENT
[FreeTextEntry1] : 72 yo wf  healthy, mild HTN, polycythemia w/ macrocytosis, osteoporosis.. well controlled and small patch psoriasis w/ recurrent enthesopathies  \par \par 1) Psoriasis possible PsA: presented with increase c/o stiffness and several PIP joints full/ boggy and ttt w/ stiffness in knees and R ankle, biceps tendinopathy and bl lateral epicondyles and intermittent plantar fascitis ... .  \par Psoriasis on scalp.. trial of MTX not tolerated- severe GI distress, switched to  Otezla full control of rash and nearly complete control of joint swelling (several PIPs and R SI joint ttt)- but dissatisfied.. wanted to try something more, switched to Humira x 2 injections, joint pain considerably worse, restarted Otezla. Does not want to reconsider even low dose MTX/ or trial of LD LEF.. will add NSAIDs- meloxicam / ibuprofen NOT helpful (and caused GI distress) will try diclofenac 50 mg as Arthrotec.. if not covered should take with Pepcid.. ideally  mg, but if needed occas 150 mg divided TNFi vs IL17, may consider Cosentyx if needed.. next \par Discussed SSA added to regimen but severe GI issues unlikely to tolerate   \par Continues w/ topical NSAIds/ CBD with some improvement PRN\par FUnctionally still has NO limitations, doing everything to include wt lifting/ working FT (due to retire june)\par Labs 6/22 nl CBC, CMP- exept slight increase in TB 1.2 (from 0.9- likely due to increase in NSAIDS, ESR, CRP, \par No personal  AS, inflammatory back, bowel or eye dz- but will get SI films / HLAB27 now\par NOTE: \par - Otezla effective controlling skin and nearly full control of joints but severe GI distress\par - failed to tolerate MTX, hair loss and no improvement\par - R shoulder RTC pathology and tendinosis and recent rupture biceps tendon.. but exercises/ heavy lifting and thought to be r/t...\par + FH mother w/ Psoriasis  \par - previous NSAIDs limited benefit\par \par 2) OA:  diffusely, mild with no deformities and fROM throughout\par \par 3) IBS-C: controlled and worried that any tx for PsA may worsen, will need to monitor  \par + IBS C daily probiotic + response..and IBGuard \par \par 4) Raynauds phenomenon:  mild, for many ys w/ little in H & P to suggest CTD.  Denies mucositis, serositis (no cardiopulmonary, HSM), no renal dysfunction, rash, alopecia, cytopenias, bleeding or clotting dyscrasias\par \par 5) Osteoporosis:  on Actonel monthly but admits to poor compliance though well tolerated. Updated study 11/20 scheduled.. Dr. Jose (need to review) \par Taking Vit D and routine wt bearing activity\par Stable wt x many ys w/ BMI 21\par Minimal other risk factors \par L wrist fracture - traumatically induced post menopausal/ R ankle fracture- trauma premenopausal \par \par 6) Elevated bili:  progressive increase over past few mnths, w/ nl LFTs.  Need to monitor closely \par Hep B/C negative 6/22 \par \par Age appropriate malignancy screening:  \par colonoscopy/ endoscopy, mammogram 2020 neg, pap 2020, no recent  CXR\par Osteoporosis 2019 updated.. severe \par \par Plan: \par - resume Otezla\par \par - TB slightly elevated likely r/t high intake NSAIDs/ APAP, needs to minimize- but wants to retry NSAIDs.. will need labs in 1-2 m after starting.. low dose diclofenac 50 mg - 150 mg ..\par \par - need to see Updated dXA and discuss tx options.. given poor compliance with oral tx.. \par \par - OK to continue  Voltaren gel / and or CBD \par \par - RPA 3-4 m\par \par -Is aware to call if there is any change in her underlying symptoms. \par \par -Arthrotec one pill 3ID as needed for joint pain. \par \par \par

## 2022-08-18 NOTE — HISTORY OF PRESENT ILLNESS
[FreeTextEntry1] : 20\par -Methotrexate: upset stomach, hair loss did not tolerate well. not taking. Otezla: twice a day tolerating well. no GI issues from it anymore. Tried meloxicam and did not do anything. \par -Partial tear in R rotator. Neck pain. Going to chiropractor soon. \par -Both knees pain. R lower back pain. Pain is interfering with faily activies. Still active going to the gym.\par -Psoriasis fully resolved.\par -Retired working as a consultant.    \par \par 1) Psoriasis ? PsA\par 2) IBS-C\par 3) Macrocytosis w/ polycythemia\par 4) Osteoporosis \par ______________________________________________________________________________\par \par (Initial HPI 10/20) \par 68 yo wf  w/ hx of psoriasis with recent increase in joint pain involving  knees, hands, R ankle (old fracture, intermittent swelling),  and most significantly R shoulder mild intermittent ... \par Stiffness in am less than 30 mins \par Hx of plantar fascitis, occas shoulder bursitis.. with RTC partial tear (confirmed MRI w/ diffuse inflammatory changes)  and bicep tear R arm.. 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 seen by Dr. Andres no treatment or additional studies beyond labs - BM not indicated \par + IBS C daily probiotic + response..and Ibguard..\par  \par Occas numbness w/ raynauds changes for many no digital ulcerations.  \par \par Mild HTN \par Age appropriate malignancy screening:  \par colonoscopy/ endoscopy, mammogram, pap 2020  smear, CXR\par Osteoporosis 2019 updated.. severe \par \par +FH mother psoriasis.. .

## 2022-08-18 NOTE — REVIEW OF SYSTEMS
[Arthralgias] : arthralgias [Joint Pain] : joint pain [As Noted in HPI] : as noted in HPI [Skin Lesions] : skin lesion [Negative] : Heme/Lymph [FreeTextEntry3] : visual loss R eye long standing and denies inflammatory eye dz [FreeTextEntry9] : pain and deformity but fROM  [de-identified] : scalp psoriasis- resolved

## 2022-08-23 RX ORDER — MISOPROSTOL 200 UG/1
200 TABLET ORAL
Qty: 180 | Refills: 1 | Status: ACTIVE | COMMUNITY
Start: 2022-08-23 | End: 1900-01-01

## 2022-08-23 RX ORDER — DICLOFENAC POTASSIUM 50 MG/1
50 TABLET, COATED ORAL
Qty: 180 | Refills: 1 | Status: ACTIVE | COMMUNITY
Start: 2022-08-23 | End: 1900-01-01

## 2022-08-24 RX ORDER — ADALIMUMAB 40MG/0.4ML
40 KIT SUBCUTANEOUS
Qty: 3 | Refills: 3 | Status: DISCONTINUED | COMMUNITY
Start: 2022-06-24 | End: 2022-08-24

## 2022-08-24 RX ORDER — DICLOFENAC SODIUM AND MISOPROSTOL 50; 200 MG/1; UG/1
50-200 TABLET, FILM COATED ORAL
Refills: 0 | Status: DISCONTINUED | COMMUNITY
End: 2022-08-24

## 2022-08-30 ENCOUNTER — NON-APPOINTMENT (OUTPATIENT)
Age: 72
End: 2022-08-30

## 2022-11-01 ENCOUNTER — APPOINTMENT (OUTPATIENT)
Dept: RHEUMATOLOGY | Facility: CLINIC | Age: 72
End: 2022-11-01

## 2022-11-01 VITALS
BODY MASS INDEX: 20.12 KG/M2 | SYSTOLIC BLOOD PRESSURE: 110 MMHG | TEMPERATURE: 97.6 F | DIASTOLIC BLOOD PRESSURE: 68 MMHG | WEIGHT: 103 LBS | HEART RATE: 76 BPM

## 2022-11-01 DIAGNOSIS — M54.2 CERVICALGIA: ICD-10-CM

## 2022-11-01 PROCEDURE — 99213 OFFICE O/P EST LOW 20 MIN: CPT

## 2022-11-01 RX ORDER — DORZOLAMIDE HYDROCHLORIDE TIMOLOL MALEATE 20; 5 MG/ML; MG/ML
22.3-6.8 SOLUTION/ DROPS OPHTHALMIC
Qty: 20 | Refills: 0 | Status: ACTIVE | COMMUNITY
Start: 2022-09-17

## 2022-11-01 RX ORDER — TRAVOPROST 0.04 MG/ML
0 SOLUTION OPHTHALMIC
Qty: 2 | Refills: 0 | Status: ACTIVE | COMMUNITY
Start: 2021-10-30

## 2022-11-01 RX ORDER — HYOSCYAMINE SULFATE 0.12 MG/1
0.12 TABLET SUBLINGUAL
Qty: 90 | Refills: 0 | Status: ACTIVE | COMMUNITY
Start: 2022-07-21

## 2022-11-01 RX ORDER — DICLOFENAC SODIUM AND MISOPROSTOL 50; 200 MG/1; UG/1
50-0.2 TABLET, DELAYED RELEASE ORAL
Qty: 90 | Refills: 3 | Status: ACTIVE | COMMUNITY
Start: 2022-08-18 | End: 1900-01-01

## 2022-11-01 RX ORDER — FOLIC ACID 1 MG/1
1 TABLET ORAL DAILY
Qty: 90 | Refills: 3 | Status: DISCONTINUED | COMMUNITY
Start: 2021-10-22 | End: 2022-11-01

## 2022-11-01 RX ORDER — TIZANIDINE 2 MG/1
2 TABLET ORAL
Qty: 60 | Refills: 2 | Status: ACTIVE | COMMUNITY
Start: 2022-11-01 | End: 1900-01-01

## 2022-11-01 RX ORDER — TRIAMTERENE AND HYDROCHLOROTHIAZIDE 37.5; 25 MG/1; MG/1
37.5-25 CAPSULE ORAL
Qty: 90 | Refills: 0 | Status: ACTIVE | COMMUNITY
Start: 2022-07-21

## 2022-11-01 NOTE — PHYSICAL EXAM
[General Appearance - Alert] : alert [General Appearance - In No Acute Distress] : in no acute distress [General Appearance - Well Nourished] : well nourished [General Appearance - Well Developed] : well developed [General Appearance - Well-Appearing] : healthy appearing [Sclera] : the sclera and conjunctiva were normal [Outer Ear] : the ears and nose were normal in appearance [Nasal Cavity] : the nasal mucosa and septum were normal [Auscultation Breath Sounds / Voice Sounds] : lungs were clear to auscultation bilaterally [Heart Rate And Rhythm] : heart rate was normal and rhythm regular [Heart Sounds] : normal S1 and S2 [Heart Sounds Gallop] : no gallops [Murmurs] : no murmurs [Heart Sounds Pericardial Friction Rub] : no pericardial rub [Full Pulse] : the pedal pulses are present [Edema] : there was no peripheral edema [No CVA Tenderness] : no ~M costovertebral angle tenderness [Abnormal Walk] : normal gait [Nail Clubbing] : no clubbing  or cyanosis of the fingernails [Musculoskeletal - Swelling] : no joint swelling seen [Motor Tone] : muscle strength and tone were normal [Skin Color & Pigmentation] : normal skin color and pigmentation [Skin Turgor] : normal skin turgor [] : no rash [Sensation] : the sensory exam was normal to light touch and pinprick [No Focal Deficits] : no focal deficits [Oriented To Time, Place, And Person] : oriented to person, place, and time [Impaired Insight] : insight and judgment were intact [Affect] : the affect was normal [Mood] : the mood was normal [FreeTextEntry1] : feeling much better with + response to Tx

## 2022-11-01 NOTE — HISTORY OF PRESENT ILLNESS
[FreeTextEntry1] : 2022\par \par - Takes diclofenac combination BID.....it is somewhat effective for her stiffness and swelling.....still stiff for about 15-20 minutes after she wakes up......stiffness does not prevent her from performing her daily tasks\par - Having issues sleeping due to partial tear of her rotator cuff in R shoulder.....has tried CBD supplements w/melatonin and they were ineffective w/one dose......experiences a delayed effect when she takes two dosages (if she takes it right after dinner she feels the effects right before she has to get up)....never taken a muscle relaxant but ready to take something.. \par - Goes to chiropractor for shoulder and it helps for some days, but pain returns......also has bicep tendon tear in R arm (tore while trying to lift couch w/R arm and reach for something underneath w/L arm)\par - Takes hyoscyamine as needed for slight IBS symptoms.\par  \par \par 1) Psoriasis ? PsA\par 2) IBS-C\par 3) Macrocytosis w/ polycythemia\par 4) Osteoporosis \par ______________________________________________________________________________\par \par (Initial HPI 10/20) \par 68 yo wf  w/ hx of psoriasis with recent increase in joint pain involving  knees, hands, R ankle (old fracture, intermittent swelling),  and most significantly R shoulder mild intermittent ... \par Stiffness in am less than 30 mins \par Hx of plantar fascitis, occas shoulder bursitis.. with RTC partial tear (confirmed MRI w/ diffuse inflammatory changes)  and bicep tear R arm.. 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 seen by Dr. Andres no treatment or additional studies beyond labs - BM not indicated \par + IBS C daily probiotic + response..and Ibguard..\par  \par Occas numbness w/ raynauds changes for many no digital ulcerations.  \par \par Mild HTN \par Age appropriate malignancy screening:  \par colonoscopy/ endoscopy, mammogram, pap 2020  smear, CXR\par Osteoporosis 2019 updated.. severe \par \par +FH mother psoriasis.. .

## 2022-11-01 NOTE — ASSESSMENT
[FreeTextEntry1] : 70 yo wf  healthy, mild HTN, polycythemia w/ macrocytosis, osteoporosis.. well controlled and small patch psoriasis w/ recurrent enthesopathies  \par \par 1) Psoriasis possible PsA: presented with increase c/o stiffness and several PIP joints full/ boggy and ttt w/ stiffness in knees and R ankle, biceps tendinopathy and bl lateral epicondyles and intermittent plantar fascitis ... .  \par Psoriasis on scalp.. trial of MTX not tolerated- severe GI distress, switched to  Otezla full control of rash and nearly complete control of joint swelling (several PIPs and R SI joint ttt)- but dissatisfied.. wanted to try something more, switched to Humira x 2 injections, joint pain considerably worse, restarted Otezla. Does not want to reconsider even low dose MTX/ or trial of LD LEF.. will add NSAIDs- meloxicam / ibuprofen NOT helpful (and caused GI distress) will try diclofenac 50 mg as Arthrotec.. if not covered should take with Pepcid.. ideally  mg, but if needed occas 150 mg max dose.  \par If needed in future would consider switch to  TNFi vs IL17, may consider Cosentyx\par Discussed SSA added to regimen but severe GI issues unlikely to tolerate   \par Continues w/ topical NSAIds/ CBD with some improvement PRN\par FUnctionally still has NO limitations, doing everything to include wt lifting/ working FT (due to retire june)\par Labs 6/22 nl CBC, CMP- except slight increase in TB 1.2 (from 0.9- likely due to increase in NSAIDS, ESR, CRP, \par No personal  AS, inflammatory back, bowel or eye dz- but will get SI films / HLAB27 now\par NOTE: \par - Otezla effective controlling skin and nearly full control of joints but severe GI distress\par - failed to tolerate MTX, hair loss and no improvement\par - R shoulder RTC pathology and tendinosis and recent rupture biceps tendon.. but exercises/ heavy lifting and thought to be r/t...\par + FH mother w/ Psoriasis  \par - previous NSAIDs limited benefit\par \par 2) OA/ diffuse mild chronic pain:  diffusely, mild with no deformities and fROM throughout\par - not sleeping at all less than 2-4 hs /night. Has tried CBD, and melatonin with minimal benefit. Will try tizanidine 2-8 mg at HS .. call with response \par \par 3) IBS-C: controlled and worried that any tx for PsA may worsen, will need to monitor  \par + IBS C daily probiotic + response..and IBGuard \par \par 4) Raynauds phenomenon:  mild, for many ys w/ little in H & P to suggest CTD.  Denies mucositis, serositis (no cardiopulmonary, HSM), no renal dysfunction, rash, alopecia, cytopenias, bleeding or clotting dyscrasias\par \par 5) Osteoporosis:  on Actonel monthly but admits to poor compliance though well tolerated. Updated study 11/20 scheduled.. Dr. Jose (need to review) \par Taking Vit D and routine wt bearing activity\par Stable wt x many ys w/ BMI 21\par Minimal other risk factors \par L wrist fracture - traumatically induced post menopausal/ R ankle fracture- trauma premenopausal \par \par 6) Elevated bili:  progressive increase over past few mnths, w/ nl LFTs.  Need to monitor closely \par Hep B/C negative 6/22 \par \par Age appropriate malignancy screening:  \par colonoscopy/ endoscopy, mammogram 2020 neg, pap 2020, no recent  CXR\par Osteoporosis 2019 updated.. severe \par \par Plan: \par - Complete labs every 3 months as long as anti-inflammatories are being used daily\par \par - Do 15-20 minutes of mechanical massage on shoulders and neck for myofascial release  \par \par - Referring to physical therapy\par \par - Starting on 2 mg of tizanidine to be increased by 2 mg until they become effective or until you reach 8 mg.\par \par - start taking 400-500 mg of magnesium w/ 2-10 mg of melatonin\par \par - RTO in 6 months......call if symptoms worsen or if tizanidine is not effective\par \par - need to see Updated dXA and discuss tx options.. given poor compliance with oral tx.. \par \par -Is aware to call if there is any change in her underlying symptoms. \par \par -Arthrotec one pill 3ID as needed for joint pain. \par \par \par

## 2022-11-01 NOTE — REVIEW OF SYSTEMS
[Arthralgias] : arthralgias [Joint Pain] : joint pain [Negative] : Heme/Lymph [Feeling Tired] : feeling tired [Joint Swelling] : joint swelling [Joint Stiffness] : joint stiffness [As Noted in HPI] : as noted in HPI [Sleep Disturbances] : sleep disturbances [FreeTextEntry2] : Extremely fatigued  [FreeTextEntry3] : visual loss R eye long standing (glaucoma- better lately)  and denies inflammatory eye dz [FreeTextEntry7] : IBS is controlled w/hyoscyamine PRN [FreeTextEntry9] : pain and deformity but fROM; joint pain and stiffness minimal at this point [de-identified] : scalp psoriasis- resolved

## 2022-11-02 ENCOUNTER — APPOINTMENT (OUTPATIENT)
Dept: RHEUMATOLOGY | Facility: CLINIC | Age: 72
End: 2022-11-02

## 2022-11-21 ENCOUNTER — APPOINTMENT (OUTPATIENT)
Dept: OBGYN | Facility: CLINIC | Age: 72
End: 2022-11-21

## 2022-11-30 ENCOUNTER — APPOINTMENT (OUTPATIENT)
Dept: RHEUMATOLOGY | Facility: CLINIC | Age: 72
End: 2022-11-30

## 2023-01-12 RX ORDER — IBANDRONATE SODIUM 150 MG/1
150 TABLET ORAL
Qty: 3 | Refills: 3 | Status: ACTIVE | COMMUNITY
Start: 2023-01-12 | End: 1900-01-01

## 2023-01-17 ENCOUNTER — NON-APPOINTMENT (OUTPATIENT)
Age: 73
End: 2023-01-17

## 2023-02-09 RX ORDER — APREMILAST 30 MG/1
30 TABLET, FILM COATED ORAL
Qty: 180 | Refills: 3 | Status: ACTIVE | COMMUNITY
Start: 2022-08-18 | End: 1900-01-01

## 2023-03-01 ENCOUNTER — APPOINTMENT (OUTPATIENT)
Dept: OBGYN | Facility: CLINIC | Age: 73
End: 2023-03-01
Payer: MEDICARE

## 2023-03-01 ENCOUNTER — RESULT CHARGE (OUTPATIENT)
Age: 73
End: 2023-03-01

## 2023-03-01 VITALS
WEIGHT: 103 LBS | HEIGHT: 60 IN | BODY MASS INDEX: 20.22 KG/M2 | SYSTOLIC BLOOD PRESSURE: 152 MMHG | HEART RATE: 74 BPM | DIASTOLIC BLOOD PRESSURE: 87 MMHG

## 2023-03-01 DIAGNOSIS — R92.8 OTHER ABNORMAL AND INCONCLUSIVE FINDINGS ON DIAGNOSTIC IMAGING OF BREAST: ICD-10-CM

## 2023-03-01 DIAGNOSIS — Z00.00 ENCOUNTER FOR GENERAL ADULT MEDICAL EXAMINATION W/OUT ABNORMAL FINDINGS: ICD-10-CM

## 2023-03-01 LAB
BILIRUB UR QL STRIP: NORMAL
CLARITY UR: CLEAR
COLLECTION METHOD: NORMAL
DATE COLLECTED: NORMAL
GLUCOSE UR-MCNC: NORMAL
HCG UR QL: 0.2 EU/DL
HEMOCCULT SP1 STL QL: NEGATIVE
HEMOGLOBIN: 13.1
HGB UR QL STRIP.AUTO: NORMAL
KETONES UR-MCNC: NORMAL
LEUKOCYTE ESTERASE UR QL STRIP: NORMAL
NITRITE UR QL STRIP: NORMAL
PH UR STRIP: 5.5
PROT UR STRIP-MCNC: NORMAL
QUALITY CONTROL: YES
SP GR UR STRIP: 1.03

## 2023-03-01 PROCEDURE — 82270 OCCULT BLOOD FECES: CPT

## 2023-03-01 PROCEDURE — 99213 OFFICE O/P EST LOW 20 MIN: CPT | Mod: 25

## 2023-03-01 PROCEDURE — G0101: CPT | Mod: 59

## 2023-03-01 PROCEDURE — 85018 HEMOGLOBIN: CPT | Mod: QW

## 2023-03-01 PROCEDURE — 81003 URINALYSIS AUTO W/O SCOPE: CPT | Mod: QW

## 2023-03-01 NOTE — PLAN
[FreeTextEntry1] : Patient to follow up in 1 year for annual GYN exam\par mammo w/ sono- 01/2024; Rx given \par left breast ultrasound 6 month f/u- 06/2023; Rx given\par Colonoscopy due: 2029\par followed rheum Stamatos regularly; Bone density due: 1/2025\par Pap off cuff ordered\par Hemoccult ordered\par All questions answered, patient is agreeable with plan.\par PMB precautions reviewed\par vaginal lubricants PRN\par \par \par I Barbie WILKINSONC am scribing for the presence of Dr. Jose the following sections HISTORY OF PRESENT ILLNESS, PAST MEDICAL/FAMILY/SOCIAL HISTORY; REVIEW OF SYSTEMS; VITAL SIGNS; PHYSICAL EXAM; DISPOSITION. \par \par \par I personally performed the services described in the documentation, reviewed the documentation recorded by the scribe in my presence and it accurately and completely records my words and actions.\par

## 2023-03-01 NOTE — HISTORY OF PRESENT ILLNESS
[FreeTextEntry1] : 72 yr old presents for annual exam today\par Pt endorses no complaints at this time\par \par GYN hx- hysterectomy, osteoporosis on Boniva for appx 2 years. reports poor adherence though well tolerated. followed rheum [place].

## 2023-03-01 NOTE — PHYSICAL EXAM
[Appropriately responsive] : appropriately responsive [Alert] : alert [No Acute Distress] : no acute distress [No Lymphadenopathy] : no lymphadenopathy [Soft] : soft [Non-tender] : non-tender [Non-distended] : non-distended [No HSM] : No HSM [No Lesions] : no lesions [No Mass] : no mass [Oriented x3] : oriented x3 [Examination Of The Breasts] : a normal appearance [No Masses] : no breast masses were palpable [Vulvar Atrophy] : vulvar atrophy [Labia Majora] : normal [Labia Minora] : normal [Normal] : normal [Atrophy] : atrophy [Uterine Adnexae] : normal [External Hemorrhoid] : external hemorrhoid [Normal rectal exam] : was normal [Absent] : absent [Occult Blood Positive] : was negative for occult blood analysis

## 2023-03-04 LAB
ALBUMIN SERPL ELPH-MCNC: 4.3 G/DL
ALP BLD-CCNC: 110 U/L
ALT SERPL-CCNC: 46 U/L
ANION GAP SERPL CALC-SCNC: 11 MMOL/L
AST SERPL-CCNC: 67 U/L
BASOPHILS # BLD AUTO: 0.05 K/UL
BASOPHILS NFR BLD AUTO: 0.9 %
BILIRUB SERPL-MCNC: 1.1 MG/DL
BUN SERPL-MCNC: 18 MG/DL
CALCIUM SERPL-MCNC: 9.9 MG/DL
CHLORIDE SERPL-SCNC: 103 MMOL/L
CO2 SERPL-SCNC: 26 MMOL/L
CREAT SERPL-MCNC: 0.8 MG/DL
CRP SERPL-MCNC: <3 MG/L
EGFR: 77 ML/MIN/1.73M2
EOSINOPHIL # BLD AUTO: 0.04 K/UL
EOSINOPHIL NFR BLD AUTO: 0.7 %
ERYTHROCYTE [SEDIMENTATION RATE] IN BLOOD BY WESTERGREN METHOD: 12 MM/HR
GLUCOSE SERPL-MCNC: 90 MG/DL
HCT VFR BLD CALC: 41.2 %
HGB BLD-MCNC: 13.7 G/DL
IMM GRANULOCYTES NFR BLD AUTO: 0.2 %
LYMPHOCYTES # BLD AUTO: 1.71 K/UL
LYMPHOCYTES NFR BLD AUTO: 29.9 %
MAN DIFF?: NORMAL
MCHC RBC-ENTMCNC: 32.5 PG
MCHC RBC-ENTMCNC: 33.3 GM/DL
MCV RBC AUTO: 97.6 FL
MONOCYTES # BLD AUTO: 0.37 K/UL
MONOCYTES NFR BLD AUTO: 6.5 %
NEUTROPHILS # BLD AUTO: 3.53 K/UL
NEUTROPHILS NFR BLD AUTO: 61.8 %
PLATELET # BLD AUTO: 223 K/UL
POTASSIUM SERPL-SCNC: 3.8 MMOL/L
PROT SERPL-MCNC: 6.3 G/DL
RBC # BLD: 4.22 M/UL
RBC # FLD: 12.2 %
SODIUM SERPL-SCNC: 140 MMOL/L
WBC # FLD AUTO: 5.71 K/UL

## 2023-03-06 LAB — CYTOLOGY CVX/VAG DOC THIN PREP: ABNORMAL

## 2023-03-10 ENCOUNTER — APPOINTMENT (OUTPATIENT)
Dept: RHEUMATOLOGY | Facility: CLINIC | Age: 73
End: 2023-03-10

## 2023-06-01 ENCOUNTER — APPOINTMENT (OUTPATIENT)
Dept: ORTHOPEDIC SURGERY | Facility: CLINIC | Age: 73
End: 2023-06-01
Payer: MEDICARE

## 2023-06-01 ENCOUNTER — NON-APPOINTMENT (OUTPATIENT)
Age: 73
End: 2023-06-01

## 2023-06-01 VITALS
WEIGHT: 104 LBS | DIASTOLIC BLOOD PRESSURE: 78 MMHG | HEART RATE: 62 BPM | SYSTOLIC BLOOD PRESSURE: 138 MMHG | HEIGHT: 60 IN | BODY MASS INDEX: 20.42 KG/M2

## 2023-06-01 DIAGNOSIS — S46.211A STRAIN OF MUSCLE, FASCIA AND TENDON OF OTHER PARTS OF BICEPS, RIGHT ARM, INITIAL ENCOUNTER: ICD-10-CM

## 2023-06-01 PROCEDURE — 99214 OFFICE O/P EST MOD 30 MIN: CPT

## 2023-06-01 PROCEDURE — 73030 X-RAY EXAM OF SHOULDER: CPT | Mod: RT

## 2023-06-01 NOTE — PHYSICAL EXAM
[de-identified] : Physical Exam: \par General: Well appearing, no acute distress \par Neurologic: A&Ox3, No focal deficits \par Head: NCAT without abrasions, lacerations, or ecchymosis to head, face, or scalp \par Eyes: No scleral icterus, no gross abnormalities \par Respiratory: Equal chest wall expansion bilaterally, no accessory muscle use \par Lymphatic: No lymphadenopathy palpated \par Skin: Warm and dry Psychiatric: Normal mood and affect  \par \par Right Shoulder · \par \par Inspection/Palpation: no tenderness, swelling or deformities · \par Range of Motion: no crepitus with ROM; Active FF 0 - 160 ; ER at side 0 - 40 ; IR to L1 ; \par Strength: forward elevation in scapular plane 4/5, internal rotation 4/5, external rotation 4/5, adduction 4/5 and abduction 4/5 · \par Stability: no joint instability on provocative testing · \par Tests: Brumfield test positive, Neer positive, positive drop arm test secondary to pain, bear hug test positive, Napolean sign negative, cross arm adduction negative, lift off sign positive, hornblowers sign negative, speeds test negative, Yergason's test negative, no bicipital groove tenderness, Young's Active Compression test POS, whipple test POS, bicep's load II test negative \par \par Left Shoulder · \par \par Inspection/Palpation: no tenderness, swelling or deformities · \par Range of Motion: full and painless in all planes, no crepitus · \par Strength: forward elevation in scapular plane 5/5, internal rotation 5/5, external rotation 5/5, adduction 5/5 and abduction 5/5 · \par Stability: no joint instability on provocative testing · Tests: Brumfield test negative, Neer sign negative, negative drop arm test secondary to pain, bear hug test negative, Napolean sign negative, cross arm adduction negative, lift off sign positive, hornblowers sign negative, speeds test negative, Yergason's test negative, no bicipital groove tenderness, Young's Active Compression test negative  [de-identified] : The following radiographs were ordered and read by me during this patients visit. I reviewed each radiograph in detail with the patient and discussed the findings as highlighted below.   \par \par 4 views of the Right (R) shoulder show no fracture, dislocation or bony lesions. There is no evidence of degenerative change in the glenohumeral and acromioclavicular joints with maintenance of the joint space. Otherwise unremarkable.

## 2023-06-01 NOTE — DISCUSSION/SUMMARY
[de-identified] : We had a thorough discussion regarding the nature of her pain, the pathophysiology, as well as all treatment options. Patient was given prescription of formal physical therapy that she will perform 2x/wk for 6-8 wks. She has tried conservative treatment such as at home exercises, extensive physical therapy, cortisone injections and OTC NSAIDs.  Considering the patient's current presentation of pain, physical exam, and radiographs an MRI is indicated at this time. A prescription for this was given to the patient. We will go over the MRI results with her upon obtaining the results in the office and advise her of further treatment options. She agrees with the above plan and all questions were answered.

## 2023-06-01 NOTE — HISTORY OF PRESENT ILLNESS
[Worsening] : worsening [___ yrs] : [unfilled] year(s) ago [de-identified] : ISABEL is a 72 year female who presents today with complaints of right shoulder pain. She denies any specific JC. She reports pain has occurred for approximately 3 years. She states she has received multiple cortisone injections in the past for RTC tear. She has been going to [facility] and feeling good results but is not where she wants to be. She owns a personal fitness company. She is here today seeking further evaluation.  She reports she is able to work out however has difficulty with lateral raises.  She does have intermittent pain and some dysfunction however is able to do a lot of her activities.  She is here for another opinion. Patient is right hand dominant.\par

## 2023-06-01 NOTE — ADDENDUM
[FreeTextEntry1] : Documented by Chel Tapia acting as a scribe for Dr. Love and Nakul Martinez PA-C on 06/01/2023.   All medical record entries made by the Scribe were at my, Dr. Love, and Nakul Martinez's, direction and personally dictated by me on 06/01/2023. I have reviewed the chart and agree that the record accurately reflects my personal performance of the history, physical exam, procedure and imaging.

## 2023-06-19 ENCOUNTER — OUTPATIENT (OUTPATIENT)
Dept: OUTPATIENT SERVICES | Facility: HOSPITAL | Age: 73
LOS: 1 days | End: 2023-06-19
Payer: MEDICARE

## 2023-06-19 ENCOUNTER — APPOINTMENT (OUTPATIENT)
Dept: MRI IMAGING | Facility: CLINIC | Age: 73
End: 2023-06-19
Payer: MEDICARE

## 2023-06-19 DIAGNOSIS — Z98.890 OTHER SPECIFIED POSTPROCEDURAL STATES: Chronic | ICD-10-CM

## 2023-06-19 DIAGNOSIS — S46.211A STRAIN OF MUSCLE, FASCIA AND TENDON OF OTHER PARTS OF BICEPS, RIGHT ARM, INITIAL ENCOUNTER: ICD-10-CM

## 2023-06-19 DIAGNOSIS — S46.011D STRAIN OF MUSCLE(S) AND TENDON(S) OF THE ROTATOR CUFF OF RIGHT SHOULDER, SUBSEQUENT ENCOUNTER: ICD-10-CM

## 2023-06-19 PROCEDURE — 73221 MRI JOINT UPR EXTREM W/O DYE: CPT

## 2023-06-19 PROCEDURE — 73221 MRI JOINT UPR EXTREM W/O DYE: CPT | Mod: 26,RT,MH

## 2023-06-27 ENCOUNTER — APPOINTMENT (OUTPATIENT)
Dept: ORTHOPEDIC SURGERY | Facility: CLINIC | Age: 73
End: 2023-06-27
Payer: MEDICARE

## 2023-06-27 DIAGNOSIS — S46.011D STRAIN OF MUSCLE(S) AND TENDON(S) OF THE ROTATOR CUFF OF RIGHT SHOULDER, SUBSEQUENT ENCOUNTER: ICD-10-CM

## 2023-06-27 PROCEDURE — 99447 NTRPROF PH1/NTRNET/EHR 11-20: CPT

## 2023-07-26 ENCOUNTER — NON-APPOINTMENT (OUTPATIENT)
Age: 73
End: 2023-07-26

## 2023-08-07 ENCOUNTER — NON-APPOINTMENT (OUTPATIENT)
Age: 73
End: 2023-08-07

## 2023-09-26 ENCOUNTER — APPOINTMENT (OUTPATIENT)
Dept: ORTHOPEDIC SURGERY | Facility: CLINIC | Age: 73
End: 2023-09-26

## 2024-01-17 ENCOUNTER — NON-APPOINTMENT (OUTPATIENT)
Age: 74
End: 2024-01-17

## 2024-03-06 ENCOUNTER — APPOINTMENT (OUTPATIENT)
Dept: RHEUMATOLOGY | Facility: CLINIC | Age: 74
End: 2024-03-06
Payer: MEDICARE

## 2024-03-06 ENCOUNTER — LABORATORY RESULT (OUTPATIENT)
Age: 74
End: 2024-03-06

## 2024-03-06 VITALS
WEIGHT: 104 LBS | OXYGEN SATURATION: 98 % | SYSTOLIC BLOOD PRESSURE: 120 MMHG | TEMPERATURE: 96.8 F | HEART RATE: 62 BPM | DIASTOLIC BLOOD PRESSURE: 74 MMHG | BODY MASS INDEX: 20.42 KG/M2 | HEIGHT: 60 IN

## 2024-03-06 DIAGNOSIS — M81.0 AGE-RELATED OSTEOPOROSIS W/OUT CURRENT PATHOLOGICAL FRACTURE: ICD-10-CM

## 2024-03-06 DIAGNOSIS — M19.041 PRIMARY OSTEOARTHRITIS, RIGHT HAND: ICD-10-CM

## 2024-03-06 DIAGNOSIS — L40.50 ARTHROPATHIC PSORIASIS, UNSPECIFIED: ICD-10-CM

## 2024-03-06 DIAGNOSIS — I73.00 RAYNAUD'S SYNDROME W/OUT GANGRENE: ICD-10-CM

## 2024-03-06 DIAGNOSIS — M19.042 PRIMARY OSTEOARTHRITIS, RIGHT HAND: ICD-10-CM

## 2024-03-06 PROCEDURE — 99214 OFFICE O/P EST MOD 30 MIN: CPT

## 2024-03-06 PROCEDURE — G2211 COMPLEX E/M VISIT ADD ON: CPT

## 2024-03-06 RX ORDER — APREMILAST 30 MG/1
30 TABLET, FILM COATED ORAL
Qty: 180 | Refills: 1 | Status: ACTIVE | COMMUNITY
Start: 2022-02-19 | End: 1900-01-01

## 2024-03-06 RX ORDER — LATANOPROSTENE BUNOD 0.24 MG/ML
0.02 SOLUTION/ DROPS OPHTHALMIC
Refills: 0 | Status: ACTIVE | COMMUNITY

## 2024-03-07 PROBLEM — M81.0 OSTEOPOROSIS: Status: ACTIVE | Noted: 2020-10-16

## 2024-03-07 PROBLEM — L40.50 PSORIATIC ARTHRITIS: Status: ACTIVE | Noted: 2021-08-26

## 2024-03-07 PROBLEM — M19.041 OSTEOARTHRITIS OF HANDS, BILATERAL: Status: ACTIVE | Noted: 2022-01-28

## 2024-03-07 PROBLEM — I73.00 RAYNAUDS PHENOMENON: Status: ACTIVE | Noted: 2020-10-16

## 2024-03-07 RX ORDER — DENOSUMAB 60 MG/ML
60 INJECTION SUBCUTANEOUS
Qty: 1 | Refills: 1 | Status: ACTIVE | COMMUNITY
Start: 2024-03-06

## 2024-03-11 NOTE — HISTORY OF PRESENT ILLNESS
[FreeTextEntry1] : 72 yo wf  healthy, mild HTN, polycythemia w/ macrocytosis, osteoporosis.. well controlled and small patch psoriasis w/ recurrent enthesopathies   Has not been seen in office since   -B/l hand pain, increasing bilateral thumbs and locking L thumb, L >R pain distally -Otezla 30mg BID. Takes advil 200mg tabs 2 at night for sleep?? not necessarily for pain -Stiffness 10 minutes in the morning -No psoriatic lesions  -Vision R eye vision loss following with oph and hx glaucoma/retinal issues  OP -Ibandronate last taken last year concerned about potential SE -Couldn't tolerate PO and difficulty remembering to take medication -Willing to trial injection prolia last dexa  -Resistance training _______________ 2022  - Takes diclofenac combination BID.....it is somewhat effective for her stiffness and swelling.....still stiff for about 15-20 minutes after she wakes up......stiffness does not prevent her from performing her daily tasks - Having issues sleeping due to partial tear of her rotator cuff in R shoulder.....has tried CBD supplements w/melatonin and they were ineffective w/one dose......experiences a delayed effect when she takes two dosages (if she takes it right after dinner she feels the effects right before she has to get up)....never taken a muscle relaxant but ready to take something..  - Goes to chiropractor for shoulder and it helps for some days, but pain returns......also has bicep tendon tear in R arm (tore while trying to lift couch w/R arm and reach for something underneath w/L arm) - Takes hyoscyamine as needed for slight IBS symptoms.    1) Psoriasis ? PsA 2) IBS-C 3) Macrocytosis w/ polycythemia 4) Osteoporosis  ______________________________________________________________________________  (Initial HPI 10/20)  70 yo wf  w/ hx of psoriasis with recent increase in joint pain involving  knees, hands, R ankle (old fracture, intermittent swelling),  and most significantly R shoulder mild intermittent ...  Stiffness in am less than 30 mins  Hx of plantar fascitis, occas shoulder bursitis.. with RTC partial tear (confirmed MRI w/ diffuse inflammatory changes)  and bicep tear R arm.. 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 Labs:  elevated Hb.. seen by Dr. Andres no treatment or additional studies beyond labs - BM not indicated  + IBS C daily probiotic + response..and Ibguard..   Occas numbness w/ raynauds changes for many no digital ulcerations.    Mild HTN  Age appropriate malignancy screening:   colonoscopy/ endoscopy, mammogram, pap 2020  smear, CXR Osteoporosis 2019 updated.. severe   +FH mother psoriasis.. .

## 2024-03-11 NOTE — DATA REVIEWED
[FreeTextEntry1] : LabS:  9/21 + [id number]  Other Diagnostics:  MR R shoulder 6/23 high grade partial thickness tear RTC with new focal full thickness tear supraspinatus.. and development of small GH joint effusion with synovial thickening  and increased fluid in SA/ SDB... 8/21  Neg SI joints, hands neg for erosions..  MRI R shoulder 8/20 w/ tendinosis and RTC pathology, AC arthritis and small GH effusion, biceps tendinosis

## 2024-03-11 NOTE — ASSESSMENT
[FreeTextEntry1] : 70 yo wf  healthy, mild HTN, polycythemia w/ macrocytosis, osteoporosis.. well controlled and small patch psoriasis w/ recurrent enthesopathies presents today for in-person follow-up, medication monitoring and renewal. Last in-person visit 11/22  1) Psoriasis possible PsA  (+HLA B27 2021):  XR SI joints neg 8/20 w/ MR 8/21 + diffuse inflammatory changes (though could be overuse) - updated study 6/23 high grade partial thickness tear RTC with new focal full thickness tear supraspinatus.. and development of small GH joint effusion with synovial thickening  and increased fluid in SA/ SDB...  Initially presented with increase c/o stiffness and several PIP joints full/ boggy and ttt w/ stiffness in knees and R ankle, biceps tendinopathy and bl lateral epicondyles and intermittent plantar fasciitis ... .   Psoriasis on scalp.. trial of MTX not tolerated- severe GI distress, switched to Otezla full control of rash and nearly complete control of joint swelling (several PIPs and R SI joint ttt)- but dissatisfied initially and  Trialed Humira x 2 injections, joint pain considerably worse, restarted Otezla and has continued for past 2 ys on monotx doing well with no overt c/o- though R shoulder clearly still an issue..  Has never trialed add on therapy though previously recommended low dose MTX (not tolerated)/ or trial of LD LEF.  In future could Cosentyx or other IL17 - would not retry TNFi.. given failed response after 2 injections (though may not have been good enough trial should have felt some change- actually felt worse).   Continues Otezla well tolerated, uses Advil 400mg HS (does not report GI distress) for sleep but does not report using for pain b/l thumbs, L thumb with trigger and locking (locking not observed on exam today) worse with use, topical NSAIDs/CBD not effective. Does report some pain and minimal decrease in function with fine motor skills, however still able to train regularly likely exacerbating triggering with lifting or possible sign more active PsA - no active psoriatic lesions at this time, fROM back able to touch toes. No result labs to review. Last labs 02/23 No personal  AS, inflammatory back, bowel or eye dz NOTE:  - Otezla effective controlling skin and nearly full control of joints but severe GI distress -Discussed SSA added to regimen but severe GI issues unlikely to tolerate    - failed to tolerate MTX, hair loss and no improvement - R shoulder RTC pathology and tendinosis and recent rupture biceps tendon.. but exercises/ heavy lifting and thought to be r/t... + FH mother w/ Psoriasis   - previous NSAIDs limited benefit  2) OA/ diffuse mild chronic pain:  diffusely, mild with no deformities and fROM throughout - not sleeping at all less than 2-4 hs /night. Has tried CBD, and melatonin with minimal benefit. Will try tizanidine 2-8 mg at HS .. call with response --> now reports improved sleep with advil 400mg taken for sleep not pain, better sleep 2/2 pain relief?? Now reports triggering and locking L thumb, likely exacerbated by training regimen Discussed if hand function becomes limited will need hand surgery evaluation to discuss options including injection, as well as possible surgical options. Right now is able to complete all daily tasks. Did not feel improvement with topical NSAIDs -Recommended eval/tx with PT for b/l hands - previously successful shoulder PT -Lifting gloves to help protect hands from injury -Continue Advil PRN low dose 400mg  3) IBS-C: controlled and worried that any tx for PsA may worsen, will need to monitor   + IBS C daily probiotic + response..and IBGuard No complaints today  4) Raynauds phenomenon:  mild, for many ys w/ little in H & P to suggest CTD.  Denies mucositis, serositis (no cardiopulmonary, HSM), no renal dysfunction, rash, alopecia, cytopenias, bleeding or clotting dyscrasias RP stable no ulceration, intermittent numbness does not feel she needs CCB tx at this time.  Denies need for vasodilator at this time  5) Osteoporosis:  on Actonel monthly but admits to poor compliance though well tolerated. Updated study 11/20 scheduled.. Dr. Jose (need to review) Discontinued Actonel last year 2/2 GI distress and difficulty remember monthly dose. Interested in prolia injection Last Dexa 01/3/2023 T-score -3.4 at L2, FN -3.1 Frax score Major: 29.59%, 10-yr 14.15%- no comparison ..  Regular wt bearing activity, recommended high wt, low reps, not currently taking vitamin D or calcium supplements. Will repeat vitamin D level today before recommendation Stable wt x many ys w/ BMI 21 ->20 Minimal other risk factors  L wrist fracture - traumatically induced post menopausal/ R ankle fracture- trauma premenopausal  -OP labs ordered, -Repeat Dexa if covered by insurance in order to assess difference over time, is this a rapidly changing study?? or just different machine..  may help with auth for prolia  6) Elevated bili:  progressive increase over past few mnths, w/ nl LFTs.  Need to monitor closely  Hep B/C negative 6/22 No recent labs for review  Age appropriate malignancy screening (did not review updates):   colonoscopy/ endoscopy, mammogram 2020 neg, pap 2020, no recent  CXR Osteoporosis 2019 updated.. severe   PLAN -Dexa repeat if approved by insurance if not will repeat 01/2025  -[facility], awaiting auth  -Labs OP panel drawn today  -Otezla renewed continue 30mg BID, needs at least 1-2 x/ year visit   -Patient declined felodipine at this time, will notify provider if worsening sx RP including ulcerations  -Ibuprofen PRN for OA b/l hands  -F/u w/ PT for b/l hand OA and triggering, recommend gloves with lifting/training  -Scheduled follow-up likely next week for prolia injection pending auth  -Schedule 6 month follow-up visit  Patient was seen and evaluated by Minerva Suggs DNP (training in rheumatology) and with ANTHONY Yates I agree with full evaluation and plan as described above.

## 2024-03-11 NOTE — REVIEW OF SYSTEMS
[Feeling Tired] : feeling tired [Arthralgias] : arthralgias [Joint Pain] : joint pain [Joint Swelling] : joint swelling [Joint Stiffness] : joint stiffness [Sleep Disturbances] : sleep disturbances [Negative] : Heme/Lymph [FreeTextEntry2] : Extremely fatigued  [As Noted in HPI] : as noted in HPI [Eyesight Problems] : eyesight problems [FreeTextEntry3] : visual loss R eye long standing (glaucoma- progressing) and denies inflammatory eye dz [FreeTextEntry9] : B/l hands worst bilateral thumbs DIPs w/ use [FreeTextEntry7] : IBS is controlled w/hyoscyamine PRN [de-identified] : scalp psoriasis- resolved. No recurrence

## 2024-03-11 NOTE — PHYSICAL EXAM
[General Appearance - Alert] : alert [General Appearance - In No Acute Distress] : in no acute distress [General Appearance - Well Nourished] : well nourished [General Appearance - Well Developed] : well developed [General Appearance - Well-Appearing] : healthy appearing [Sclera] : the sclera and conjunctiva were normal [Outer Ear] : the ears and nose were normal in appearance [Nasal Cavity] : the nasal mucosa and septum were normal [Auscultation Breath Sounds / Voice Sounds] : lungs were clear to auscultation bilaterally [Heart Rate And Rhythm] : heart rate was normal and rhythm regular [Heart Sounds] : normal S1 and S2 [Heart Sounds Gallop] : no gallops [Murmurs] : no murmurs [Heart Sounds Pericardial Friction Rub] : no pericardial rub [Full Pulse] : the pedal pulses are present [Edema] : there was no peripheral edema [No CVA Tenderness] : no ~M costovertebral angle tenderness [Nail Clubbing] : no clubbing  or cyanosis of the fingernails [Abnormal Walk] : normal gait [Musculoskeletal - Swelling] : no joint swelling seen [Motor Tone] : muscle strength and tone were normal [Skin Color & Pigmentation] : normal skin color and pigmentation [Skin Turgor] : normal skin turgor [] : no rash [Sensation] : the sensory exam was normal to light touch and pinprick [No Focal Deficits] : no focal deficits [Impaired Insight] : insight and judgment were intact [Oriented To Time, Place, And Person] : oriented to person, place, and time [Affect] : the affect was normal [Mood] : the mood was normal [No Spinal Tenderness] : no spinal tenderness [Respiration, Rhythm And Depth] : normal respiratory rhythm and effort [FreeTextEntry1] : Feeling well

## 2024-03-12 LAB
25(OH)D3 SERPL-MCNC: 43.2 NG/ML
ALBUMIN MFR SERPL ELPH: 62.3 %
ALBUMIN SERPL ELPH-MCNC: 4.7 G/DL
ALBUMIN SERPL-MCNC: 4.3 G/DL
ALBUMIN/GLOB SERPL: 1.7 RATIO
ALP BLD-CCNC: 92 U/L
ALPHA1 GLOB MFR SERPL ELPH: 3.7 %
ALPHA1 GLOB SERPL ELPH-MCNC: 0.3 G/DL
ALPHA2 GLOB MFR SERPL ELPH: 9.7 %
ALPHA2 GLOB SERPL ELPH-MCNC: 0.7 G/DL
ALT SERPL-CCNC: 20 U/L
ANION GAP SERPL CALC-SCNC: 16 MMOL/L
AST SERPL-CCNC: 23 U/L
B-GLOBULIN MFR SERPL ELPH: 11.1 %
B-GLOBULIN SERPL ELPH-MCNC: 0.8 G/DL
BASOPHILS # BLD AUTO: 0.06 K/UL
BASOPHILS NFR BLD AUTO: 1.1 %
BILIRUB SERPL-MCNC: 1.1 MG/DL
BUN SERPL-MCNC: 23 MG/DL
CALCIUM SERPL-MCNC: 9.4 MG/DL
CALCIUM SERPL-MCNC: 9.4 MG/DL
CELIACPAN: NORMAL
CHLORIDE SERPL-SCNC: 104 MMOL/L
CO2 SERPL-SCNC: 23 MMOL/L
CREAT SERPL-MCNC: 0.76 MG/DL
CRP SERPL-MCNC: <3 MG/L
EGFR: 82 ML/MIN/1.73M2
EOSINOPHIL # BLD AUTO: 0.08 K/UL
EOSINOPHIL NFR BLD AUTO: 1.4 %
ERYTHROCYTE [SEDIMENTATION RATE] IN BLOOD BY WESTERGREN METHOD: 17 MM/HR
GAMMA GLOB FLD ELPH-MCNC: 0.9 G/DL
GAMMA GLOB MFR SERPL ELPH: 13.2 %
GLUCOSE SERPL-MCNC: 89 MG/DL
HCT VFR BLD CALC: 41.7 %
HGB BLD-MCNC: 13.7 G/DL
IMM GRANULOCYTES NFR BLD AUTO: 0.2 %
INTERPRETATION SERPL IEP-IMP: NORMAL
LYMPHOCYTES # BLD AUTO: 2.09 K/UL
LYMPHOCYTES NFR BLD AUTO: 36.9 %
MAN DIFF?: NORMAL
MCHC RBC-ENTMCNC: 32.4 PG
MCHC RBC-ENTMCNC: 32.9 GM/DL
MCV RBC AUTO: 98.6 FL
MONOCYTES # BLD AUTO: 0.57 K/UL
MONOCYTES NFR BLD AUTO: 10.1 %
NEUTROPHILS # BLD AUTO: 2.86 K/UL
NEUTROPHILS NFR BLD AUTO: 50.3 %
PARATHYROID HORMONE INTACT: 32 PG/ML
PHOSPHATE SERPL-MCNC: 3.3 MG/DL
PLATELET # BLD AUTO: 217 K/UL
POTASSIUM SERPL-SCNC: 4.4 MMOL/L
PROT SERPL-MCNC: 6.7 G/DL
PROT SERPL-MCNC: 6.9 G/DL
PROT SERPL-MCNC: 6.9 G/DL
RBC # BLD: 4.23 M/UL
RBC # FLD: 13 %
SODIUM SERPL-SCNC: 143 MMOL/L
TSH SERPL-ACNC: 1.25 UIU/ML
WBC # FLD AUTO: 5.67 K/UL

## 2024-03-13 ENCOUNTER — NON-APPOINTMENT (OUTPATIENT)
Age: 74
End: 2024-03-13

## 2024-03-15 LAB — COLLAGEN CTX SERPL-MCNC: 276 PG/ML

## 2024-03-21 ENCOUNTER — APPOINTMENT (OUTPATIENT)
Dept: RHEUMATOLOGY | Facility: CLINIC | Age: 74
End: 2024-03-21

## 2024-08-23 ENCOUNTER — NON-APPOINTMENT (OUTPATIENT)
Age: 74
End: 2024-08-23

## 2024-10-15 PROBLEM — Z01.419 ENCOUNTER FOR ANNUAL ROUTINE GYNECOLOGICAL EXAMINATION: Status: ACTIVE | Noted: 2024-10-15

## 2024-10-15 PROBLEM — Z12.11 COLON CANCER SCREENING: Status: ACTIVE | Noted: 2024-10-15

## 2024-10-15 PROBLEM — Z12.4 CERVICAL CANCER SCREENING: Status: ACTIVE | Noted: 2024-10-15

## 2024-10-15 PROBLEM — N95.2 VAGINAL ATROPHY: Status: ACTIVE | Noted: 2024-10-15

## 2024-10-22 ENCOUNTER — APPOINTMENT (OUTPATIENT)
Dept: OBGYN | Facility: CLINIC | Age: 74
End: 2024-10-22
Payer: MEDICARE

## 2024-10-22 VITALS
HEART RATE: 76 BPM | WEIGHT: 106 LBS | BODY MASS INDEX: 20.81 KG/M2 | SYSTOLIC BLOOD PRESSURE: 167 MMHG | DIASTOLIC BLOOD PRESSURE: 56 MMHG | HEIGHT: 60 IN

## 2024-10-22 DIAGNOSIS — Z01.419 ENCOUNTER FOR GYNECOLOGICAL EXAMINATION (GENERAL) (ROUTINE) W/OUT ABNORMAL FINDINGS: ICD-10-CM

## 2024-10-22 DIAGNOSIS — Z12.11 ENCOUNTER FOR SCREENING FOR MALIGNANT NEOPLASM OF COLON: ICD-10-CM

## 2024-10-22 DIAGNOSIS — Z12.4 ENCOUNTER FOR SCREENING FOR MALIGNANT NEOPLASM OF CERVIX: ICD-10-CM

## 2024-10-22 DIAGNOSIS — R92.30 DENSE BREASTS, UNSPECIFIED: ICD-10-CM

## 2024-10-22 DIAGNOSIS — N95.2 POSTMENOPAUSAL ATROPHIC VAGINITIS: ICD-10-CM

## 2024-10-22 DIAGNOSIS — Z12.39 ENCOUNTER FOR OTHER SCREENING FOR MALIGNANT NEOPLASM OF BREAST: ICD-10-CM

## 2024-10-22 LAB
BILIRUB UR QL STRIP: NORMAL
CLARITY UR: CLEAR
COLLECTION METHOD: NORMAL
DATE COLLECTED: NORMAL
GLUCOSE UR-MCNC: NORMAL
HCG UR QL: 0.2 EU/DL
HEMOCCULT SP1 STL QL: NEGATIVE
HEMOGLOBIN: 13.7
HGB UR QL STRIP.AUTO: NORMAL
KETONES UR-MCNC: NORMAL
LEUKOCYTE ESTERASE UR QL STRIP: NORMAL
NITRITE UR QL STRIP: NORMAL
PH UR STRIP: 7
PROT UR STRIP-MCNC: NORMAL
QUALITY CONTROL: YES
SP GR UR STRIP: 1.02

## 2024-10-22 PROCEDURE — 81003 URINALYSIS AUTO W/O SCOPE: CPT | Mod: QW

## 2024-10-22 PROCEDURE — G0101: CPT

## 2024-10-22 PROCEDURE — 82270 OCCULT BLOOD FECES: CPT

## 2024-10-22 PROCEDURE — 85018 HEMOGLOBIN: CPT | Mod: QW

## 2024-10-22 RX ORDER — APREMILAST 30 MG/1
30 TABLET, FILM COATED ORAL
Refills: 0 | Status: ACTIVE | COMMUNITY

## 2024-10-28 LAB — CYTOLOGY CVX/VAG DOC THIN PREP: ABNORMAL

## 2025-06-13 ENCOUNTER — OUTPATIENT (OUTPATIENT)
Dept: OUTPATIENT SERVICES | Facility: HOSPITAL | Age: 75
LOS: 1 days | End: 2025-06-13

## 2025-06-13 ENCOUNTER — APPOINTMENT (OUTPATIENT)
Dept: INTERNAL MEDICINE | Facility: CLINIC | Age: 75
End: 2025-06-13

## 2025-06-13 ENCOUNTER — NON-APPOINTMENT (OUTPATIENT)
Age: 75
End: 2025-06-13

## 2025-06-13 DIAGNOSIS — Z98.890 OTHER SPECIFIED POSTPROCEDURAL STATES: Chronic | ICD-10-CM

## 2025-09-09 ENCOUNTER — APPOINTMENT (OUTPATIENT)
Dept: NEUROSURGERY | Facility: CLINIC | Age: 75
End: 2025-09-09
Payer: MEDICARE

## 2025-09-09 VITALS
WEIGHT: 104 LBS | RESPIRATION RATE: 18 BRPM | HEIGHT: 60 IN | OXYGEN SATURATION: 99 % | HEART RATE: 69 BPM | DIASTOLIC BLOOD PRESSURE: 80 MMHG | BODY MASS INDEX: 20.42 KG/M2 | SYSTOLIC BLOOD PRESSURE: 165 MMHG

## 2025-09-09 DIAGNOSIS — M54.50 LOW BACK PAIN, UNSPECIFIED: ICD-10-CM

## 2025-09-09 PROCEDURE — 99204 OFFICE O/P NEW MOD 45 MIN: CPT

## 2025-09-10 ENCOUNTER — RESULT REVIEW (OUTPATIENT)
Age: 75
End: 2025-09-10

## 2025-09-10 ENCOUNTER — APPOINTMENT (OUTPATIENT)
Dept: CT IMAGING | Facility: CLINIC | Age: 75
End: 2025-09-10
Payer: MEDICARE

## 2025-09-10 PROCEDURE — 72131 CT LUMBAR SPINE W/O DYE: CPT | Mod: 26

## 2025-09-10 PROCEDURE — 76376 3D RENDER W/INTRP POSTPROCES: CPT | Mod: 26

## 2025-09-18 ENCOUNTER — APPOINTMENT (OUTPATIENT)
Dept: PHYSICAL MEDICINE AND REHAB | Facility: CLINIC | Age: 75
End: 2025-09-18
Payer: MEDICARE

## 2025-09-18 VITALS
RESPIRATION RATE: 16 BRPM | DIASTOLIC BLOOD PRESSURE: 80 MMHG | HEIGHT: 60 IN | HEART RATE: 68 BPM | WEIGHT: 105 LBS | SYSTOLIC BLOOD PRESSURE: 120 MMHG | OXYGEN SATURATION: 97 % | BODY MASS INDEX: 20.62 KG/M2

## 2025-09-18 DIAGNOSIS — M47.816 SPONDYLOSIS W/OUT MYELOPATHY OR RADICULOPATHY, LUMBAR REGION: ICD-10-CM

## 2025-09-18 DIAGNOSIS — Q76.49 OTHER CONGENITAL MALFORMATIONS OF SPINE, NOT ASSOCIATED WITH SCOLIOSIS: ICD-10-CM

## 2025-09-18 PROCEDURE — 99204 OFFICE O/P NEW MOD 45 MIN: CPT

## 2025-09-20 PROBLEM — M47.816 FACET ARTHROPATHY, LUMBAR: Status: ACTIVE | Noted: 2025-09-20

## 2025-09-20 PROBLEM — Q76.49: Status: ACTIVE | Noted: 2025-09-20

## 2025-09-20 PROBLEM — M47.816 LUMBAR SPONDYLOSIS: Status: ACTIVE | Noted: 2025-09-20
